# Patient Record
Sex: MALE | Race: BLACK OR AFRICAN AMERICAN | NOT HISPANIC OR LATINO
[De-identification: names, ages, dates, MRNs, and addresses within clinical notes are randomized per-mention and may not be internally consistent; named-entity substitution may affect disease eponyms.]

---

## 2018-11-05 ENCOUNTER — APPOINTMENT (OUTPATIENT)
Dept: THORACIC SURGERY | Facility: CLINIC | Age: 58
End: 2018-11-05
Payer: COMMERCIAL

## 2018-11-05 VITALS
HEART RATE: 74 BPM | WEIGHT: 215 LBS | TEMPERATURE: 98 F | OXYGEN SATURATION: 97 % | SYSTOLIC BLOOD PRESSURE: 145 MMHG | HEIGHT: 72 IN | BODY MASS INDEX: 29.12 KG/M2 | RESPIRATION RATE: 18 BRPM | DIASTOLIC BLOOD PRESSURE: 95 MMHG

## 2018-11-05 DIAGNOSIS — Z80.1 FAMILY HISTORY OF MALIGNANT NEOPLASM OF TRACHEA, BRONCHUS AND LUNG: ICD-10-CM

## 2018-11-05 DIAGNOSIS — R91.1 SOLITARY PULMONARY NODULE: ICD-10-CM

## 2018-11-05 DIAGNOSIS — Z82.0 FAMILY HISTORY OF EPILEPSY AND OTHER DISEASES OF THE NERVOUS SYSTEM: ICD-10-CM

## 2018-11-05 DIAGNOSIS — L80 VITILIGO: ICD-10-CM

## 2018-11-05 DIAGNOSIS — Z78.9 OTHER SPECIFIED HEALTH STATUS: ICD-10-CM

## 2018-11-05 PROCEDURE — 99204 OFFICE O/P NEW MOD 45 MIN: CPT

## 2018-11-05 RX ORDER — ROSUVASTATIN CALCIUM 20 MG/1
20 TABLET, FILM COATED ORAL
Refills: 0 | Status: ACTIVE | COMMUNITY

## 2018-11-05 RX ORDER — IRBESARTAN 75 MG/1
75 TABLET ORAL
Refills: 0 | Status: ACTIVE | COMMUNITY

## 2018-11-05 RX ORDER — ASPIRIN ENTERIC COATED TABLETS 81 MG 81 MG/1
81 TABLET, DELAYED RELEASE ORAL
Refills: 0 | Status: ACTIVE | COMMUNITY

## 2020-07-07 ENCOUNTER — INPATIENT (INPATIENT)
Facility: HOSPITAL | Age: 60
LOS: 2 days | Discharge: HOME | DRG: 317 | End: 2020-07-10
Attending: SURGERY | Admitting: SURGERY
Payer: COMMERCIAL

## 2020-07-07 VITALS
TEMPERATURE: 99 F | WEIGHT: 199.96 LBS | DIASTOLIC BLOOD PRESSURE: 82 MMHG | HEIGHT: 72 IN | SYSTOLIC BLOOD PRESSURE: 161 MMHG | OXYGEN SATURATION: 98 % | RESPIRATION RATE: 18 BRPM | HEART RATE: 75 BPM

## 2020-07-07 DIAGNOSIS — Z95.9 PRESENCE OF CARDIAC AND VASCULAR IMPLANT AND GRAFT, UNSPECIFIED: Chronic | ICD-10-CM

## 2020-07-07 DIAGNOSIS — S27.321A CONTUSION OF LUNG, UNILATERAL, INITIAL ENCOUNTER: ICD-10-CM

## 2020-07-07 LAB
ALBUMIN SERPL ELPH-MCNC: 3.5 G/DL — SIGNIFICANT CHANGE UP (ref 3.3–5)
ALP SERPL-CCNC: 74 U/L — SIGNIFICANT CHANGE UP (ref 40–120)
ALT FLD-CCNC: 41 U/L — SIGNIFICANT CHANGE UP (ref 12–78)
ANION GAP SERPL CALC-SCNC: 5 MMOL/L — SIGNIFICANT CHANGE UP (ref 5–17)
APTT BLD: 27.5 SEC — SIGNIFICANT CHANGE UP (ref 27.5–35.5)
AST SERPL-CCNC: 45 U/L — HIGH (ref 15–37)
BASOPHILS # BLD AUTO: 0.03 K/UL — SIGNIFICANT CHANGE UP (ref 0–0.2)
BASOPHILS NFR BLD AUTO: 0.4 % — SIGNIFICANT CHANGE UP (ref 0–2)
BILIRUB SERPL-MCNC: 0.4 MG/DL — SIGNIFICANT CHANGE UP (ref 0.2–1.2)
BUN SERPL-MCNC: 19 MG/DL — SIGNIFICANT CHANGE UP (ref 7–23)
CALCIUM SERPL-MCNC: 8.1 MG/DL — LOW (ref 8.5–10.1)
CHLORIDE SERPL-SCNC: 111 MMOL/L — HIGH (ref 96–108)
CK SERPL-CCNC: 664 U/L — HIGH (ref 26–308)
CO2 SERPL-SCNC: 25 MMOL/L — SIGNIFICANT CHANGE UP (ref 22–31)
CREAT SERPL-MCNC: 1.31 MG/DL — HIGH (ref 0.5–1.3)
EOSINOPHIL # BLD AUTO: 0.13 K/UL — SIGNIFICANT CHANGE UP (ref 0–0.5)
EOSINOPHIL NFR BLD AUTO: 1.6 % — SIGNIFICANT CHANGE UP (ref 0–6)
ETHANOL SERPL-MCNC: <10 MG/DL — SIGNIFICANT CHANGE UP (ref 0–10)
GLUCOSE SERPL-MCNC: 98 MG/DL — SIGNIFICANT CHANGE UP (ref 70–99)
HCT VFR BLD CALC: 43.7 % — SIGNIFICANT CHANGE UP (ref 39–50)
HGB BLD-MCNC: 14.4 G/DL — SIGNIFICANT CHANGE UP (ref 13–17)
IMM GRANULOCYTES NFR BLD AUTO: 0.5 % — SIGNIFICANT CHANGE UP (ref 0–1.5)
INR BLD: 1.07 RATIO — SIGNIFICANT CHANGE UP (ref 0.88–1.16)
LIDOCAIN IGE QN: 78 U/L — SIGNIFICANT CHANGE UP (ref 73–393)
LYMPHOCYTES # BLD AUTO: 1.46 K/UL — SIGNIFICANT CHANGE UP (ref 1–3.3)
LYMPHOCYTES # BLD AUTO: 17.8 % — SIGNIFICANT CHANGE UP (ref 13–44)
MCHC RBC-ENTMCNC: 29.9 PG — SIGNIFICANT CHANGE UP (ref 27–34)
MCHC RBC-ENTMCNC: 33 GM/DL — SIGNIFICANT CHANGE UP (ref 32–36)
MCV RBC AUTO: 90.7 FL — SIGNIFICANT CHANGE UP (ref 80–100)
MONOCYTES # BLD AUTO: 0.51 K/UL — SIGNIFICANT CHANGE UP (ref 0–0.9)
MONOCYTES NFR BLD AUTO: 6.2 % — SIGNIFICANT CHANGE UP (ref 2–14)
NEUTROPHILS # BLD AUTO: 6.04 K/UL — SIGNIFICANT CHANGE UP (ref 1.8–7.4)
NEUTROPHILS NFR BLD AUTO: 73.5 % — SIGNIFICANT CHANGE UP (ref 43–77)
PLATELET # BLD AUTO: 198 K/UL — SIGNIFICANT CHANGE UP (ref 150–400)
POTASSIUM SERPL-MCNC: 4 MMOL/L — SIGNIFICANT CHANGE UP (ref 3.5–5.3)
POTASSIUM SERPL-SCNC: 4 MMOL/L — SIGNIFICANT CHANGE UP (ref 3.5–5.3)
PROT SERPL-MCNC: 7.1 GM/DL — SIGNIFICANT CHANGE UP (ref 6–8.3)
PROTHROM AB SERPL-ACNC: 12.4 SEC — SIGNIFICANT CHANGE UP (ref 10.6–13.6)
RBC # BLD: 4.82 M/UL — SIGNIFICANT CHANGE UP (ref 4.2–5.8)
RBC # FLD: 12.7 % — SIGNIFICANT CHANGE UP (ref 10.3–14.5)
SODIUM SERPL-SCNC: 141 MMOL/L — SIGNIFICANT CHANGE UP (ref 135–145)
WBC # BLD: 8.21 K/UL — SIGNIFICANT CHANGE UP (ref 3.8–10.5)
WBC # FLD AUTO: 8.21 K/UL — SIGNIFICANT CHANGE UP (ref 3.8–10.5)

## 2020-07-07 PROCEDURE — 80048 BASIC METABOLIC PNL TOTAL CA: CPT

## 2020-07-07 PROCEDURE — 72125 CT NECK SPINE W/O DYE: CPT | Mod: 26

## 2020-07-07 PROCEDURE — 97116 GAIT TRAINING THERAPY: CPT | Mod: GP

## 2020-07-07 PROCEDURE — 85610 PROTHROMBIN TIME: CPT

## 2020-07-07 PROCEDURE — 81003 URINALYSIS AUTO W/O SCOPE: CPT

## 2020-07-07 PROCEDURE — 36415 COLL VENOUS BLD VENIPUNCTURE: CPT

## 2020-07-07 PROCEDURE — U0003: CPT

## 2020-07-07 PROCEDURE — 85027 COMPLETE CBC AUTOMATED: CPT

## 2020-07-07 PROCEDURE — 86901 BLOOD TYPING SEROLOGIC RH(D): CPT

## 2020-07-07 PROCEDURE — 99223 1ST HOSP IP/OBS HIGH 75: CPT

## 2020-07-07 PROCEDURE — 86769 SARS-COV-2 COVID-19 ANTIBODY: CPT

## 2020-07-07 PROCEDURE — 73562 X-RAY EXAM OF KNEE 3: CPT | Mod: 26,RT

## 2020-07-07 PROCEDURE — 86900 BLOOD TYPING SEROLOGIC ABO: CPT

## 2020-07-07 PROCEDURE — 70450 CT HEAD/BRAIN W/O DYE: CPT | Mod: 26

## 2020-07-07 PROCEDURE — 97530 THERAPEUTIC ACTIVITIES: CPT | Mod: GP

## 2020-07-07 PROCEDURE — 97162 PT EVAL MOD COMPLEX 30 MIN: CPT | Mod: GP

## 2020-07-07 PROCEDURE — 85730 THROMBOPLASTIN TIME PARTIAL: CPT

## 2020-07-07 PROCEDURE — 71260 CT THORAX DX C+: CPT | Mod: 26

## 2020-07-07 PROCEDURE — 86850 RBC ANTIBODY SCREEN: CPT

## 2020-07-07 PROCEDURE — 73721 MRI JNT OF LWR EXTRE W/O DYE: CPT | Mod: RT

## 2020-07-07 PROCEDURE — 73552 X-RAY EXAM OF FEMUR 2/>: CPT | Mod: 26,RT

## 2020-07-07 PROCEDURE — 73502 X-RAY EXAM HIP UNI 2-3 VIEWS: CPT | Mod: 26,RT

## 2020-07-07 PROCEDURE — 93010 ELECTROCARDIOGRAM REPORT: CPT

## 2020-07-07 PROCEDURE — 86803 HEPATITIS C AB TEST: CPT

## 2020-07-07 PROCEDURE — 71045 X-RAY EXAM CHEST 1 VIEW: CPT

## 2020-07-07 PROCEDURE — 74177 CT ABD & PELVIS W/CONTRAST: CPT | Mod: 26

## 2020-07-07 RX ORDER — ONDANSETRON 8 MG/1
4 TABLET, FILM COATED ORAL EVERY 6 HOURS
Refills: 0 | Status: DISCONTINUED | OUTPATIENT
Start: 2020-07-07 | End: 2020-07-09

## 2020-07-07 RX ORDER — SODIUM CHLORIDE 9 MG/ML
1000 INJECTION INTRAMUSCULAR; INTRAVENOUS; SUBCUTANEOUS
Refills: 0 | Status: DISCONTINUED | OUTPATIENT
Start: 2020-07-07 | End: 2020-07-09

## 2020-07-07 RX ORDER — HEPARIN SODIUM 5000 [USP'U]/ML
5000 INJECTION INTRAVENOUS; SUBCUTANEOUS EVERY 8 HOURS
Refills: 0 | Status: DISCONTINUED | OUTPATIENT
Start: 2020-07-07 | End: 2020-07-08

## 2020-07-07 RX ORDER — ACETAMINOPHEN 500 MG
650 TABLET ORAL ONCE
Refills: 0 | Status: COMPLETED | OUTPATIENT
Start: 2020-07-07 | End: 2020-07-07

## 2020-07-07 RX ORDER — OXYCODONE AND ACETAMINOPHEN 5; 325 MG/1; MG/1
2 TABLET ORAL EVERY 6 HOURS
Refills: 0 | Status: DISCONTINUED | OUTPATIENT
Start: 2020-07-07 | End: 2020-07-09

## 2020-07-07 RX ORDER — TETANUS TOXOID, REDUCED DIPHTHERIA TOXOID AND ACELLULAR PERTUSSIS VACCINE, ADSORBED 5; 2.5; 8; 8; 2.5 [IU]/.5ML; [IU]/.5ML; UG/.5ML; UG/.5ML; UG/.5ML
0.5 SUSPENSION INTRAMUSCULAR ONCE
Refills: 0 | Status: COMPLETED | OUTPATIENT
Start: 2020-07-07 | End: 2020-07-07

## 2020-07-07 RX ORDER — ASPIRIN/CALCIUM CARB/MAGNESIUM 324 MG
81 TABLET ORAL DAILY
Refills: 0 | Status: DISCONTINUED | OUTPATIENT
Start: 2020-07-07 | End: 2020-07-09

## 2020-07-07 RX ORDER — OXYCODONE AND ACETAMINOPHEN 5; 325 MG/1; MG/1
1 TABLET ORAL EVERY 4 HOURS
Refills: 0 | Status: DISCONTINUED | OUTPATIENT
Start: 2020-07-07 | End: 2020-07-09

## 2020-07-07 RX ORDER — SODIUM CHLORIDE 9 MG/ML
500 INJECTION INTRAMUSCULAR; INTRAVENOUS; SUBCUTANEOUS ONCE
Refills: 0 | Status: COMPLETED | OUTPATIENT
Start: 2020-07-07 | End: 2020-07-07

## 2020-07-07 RX ADMIN — HEPARIN SODIUM 5000 UNIT(S): 5000 INJECTION INTRAVENOUS; SUBCUTANEOUS at 23:06

## 2020-07-07 RX ADMIN — SODIUM CHLORIDE 100 MILLILITER(S): 9 INJECTION INTRAMUSCULAR; INTRAVENOUS; SUBCUTANEOUS at 23:03

## 2020-07-07 RX ADMIN — TETANUS TOXOID, REDUCED DIPHTHERIA TOXOID AND ACELLULAR PERTUSSIS VACCINE, ADSORBED 0.5 MILLILITER(S): 5; 2.5; 8; 8; 2.5 SUSPENSION INTRAMUSCULAR at 19:50

## 2020-07-07 RX ADMIN — Medication 81 MILLIGRAM(S): at 23:06

## 2020-07-07 RX ADMIN — SODIUM CHLORIDE 500 MILLILITER(S): 9 INJECTION INTRAMUSCULAR; INTRAVENOUS; SUBCUTANEOUS at 20:43

## 2020-07-07 RX ADMIN — Medication 650 MILLIGRAM(S): at 23:07

## 2020-07-07 RX ADMIN — SODIUM CHLORIDE 500 MILLILITER(S): 9 INJECTION INTRAMUSCULAR; INTRAVENOUS; SUBCUTANEOUS at 19:42

## 2020-07-07 NOTE — H&P ADULT - NSHPLABSRESULTS_GEN_ALL_CORE
Labs:                          14.4   8.21  )-----------( 198      ( 07 Jul 2020 18:36 )             43.7       07-07    141  |  111<H>  |  19  ----------------------------<  98  4.0   |  25  |  1.31<H>    Ca    8.1<L>      07 Jul 2020 18:36    TPro  7.1  /  Alb  3.5  /  TBili  0.4  /  DBili  x   /  AST  45<H>  /  ALT  41  /  AlkPhos  74  07-07      PT/INR - ( 07 Jul 2020 18:36 )   PT: 12.4 sec;   INR: 1.07 ratio         PTT - ( 07 Jul 2020 18:36 )  PTT:27.5 sec    < from: CT Abdomen and Pelvis w/ IV Cont (07.07.20 @ 18:58) >    trauma. No acute fracture.    IMPRESSION:     Right lower lobe opacity as described above; differential includes infectious/inflammatory etiologies, atelectasis or a pulmonary contusion.    No additional acute intrathoracic or intra-abdominal sequelae of trauma.      < from: CT Cervical Spine No Cont (07.07.20 @ 18:57) >    IMPRESSION:  No acute intracranial hemorrhage    No acute fracture cervical spine. If pain persists, follow-up MRI exam recommended.          < end of copied text >

## 2020-07-07 NOTE — H&P ADULT - NSHPPHYSICALEXAM_GEN_ALL_CORE
Vital Signs Last 24 Hrs  T(C): 36.6 (07 Jul 2020 22:26), Max: 37.1 (07 Jul 2020 18:09)  T(F): 97.8 (07 Jul 2020 22:26), Max: 98.8 (07 Jul 2020 18:09)  HR: 55 (07 Jul 2020 22:26) (49 - 81)  BP: 155/85 (07 Jul 2020 22:26) (147/88 - 177/99)  BP(mean): --  RR: 18 (07 Jul 2020 22:26) (18 - 18)  SpO2: 98% (07 Jul 2020 22:26) (98% - 100%)  PHYSICAL EXAM:  Constitutional: NAD, GCS: 15/15  AOX3  Eyes:  WNL  ENMT:  WNL  Neck:  WNL, non tender  Back: Non tender  Respiratory: CTABL  Cardiovascular:  S1+S2+0  Gastrointestinal: Soft, ND , NT  Genitourinary:  WNL  Extremities: NV intact, Rt leg in KI, tendered on movement.   Vascular:  Intact  Neurological: No focal neurological deficit,  CN, motor and sensory system grossly intact.  Skin: WNL  Musculoskeletal: WNL  Psychiatric: Grossly WNL

## 2020-07-07 NOTE — CONSULT NOTE ADULT - SUBJECTIVE AND OBJECTIVE BOX
59 y/o M presents to ED s/p fall from eight approx 12 feet off ladder. Pt tried to jump to break his fall and right leg became stuck in between the ladder. Orthopedic consulted for right knee pain and swelling. Pt was unable to ambulate after injury, states his knee cap feels out of place. Pt admits to head strike but denies LOC. denies N/T RLE , no other extremity complaints. Pt was seen and evaluate by trauma team and being admitted to trauma for observation of pulmonary contusion, noted on chest CT scan   X rays right knee, no obvious Fx/dislocation, Patella Sizerock noted.   X rays pelvis/Right Hip: no obvious Fx/dislocation   X ray femur no Fx/dislocation     PMH: HTN, MI s/p 2 stents  Allergy: PCN    PE   A&O x 3  awake and alert  Neck and spine: non tender, skin intact, sensation intact    B/L Upper extremities: FROM, shoulder/elbow/wrist/fingers , non tender, no deformity, no crepitus, SILT, M/R/U nerves intact, radial pulse 2+ B/L    Left LE: skin intact, non tender, compartments soft non tender, FROM hip/knee/ankle/toes SILT, no deformity, no crepitus, DP 2+     Right LE:   skin intact  compartments soft non tender  neg axial load  no pain with log roll in hip  TTP over patella  + palpable defect at patella tendon   unable to SLR   FROM ankle and toes  SILT   DP intact

## 2020-07-07 NOTE — ED PROVIDER NOTE - MUSCULOSKELETAL, MLM
Spine appears normal, neck non-tender AFROM without pain, back and pelvis non tender and stable, right hip non tnder and no swelling, +moderate effusion and edema of right knee, +tenderness to palpation of right knee, pt unable to right SLR RLE. Left SLR normal.

## 2020-07-07 NOTE — ED PROVIDER NOTE - PROGRESS NOTE DETAILS
Javed Thomson for attending Dr. South. Tom trauma attending for consult. Dr. South:  Admission for suspected R pulm contusion accepted by Dr. Swift/Trauma.  Ortho resident aware of ED consult.

## 2020-07-07 NOTE — ED PROVIDER NOTE - SKIN, MLM
Skin normal color for race, warm, dry and intact. No evidence of rash. Superficial abrasion to posterior left thigh

## 2020-07-07 NOTE — ED ADULT TRIAGE NOTE - CHIEF COMPLAINT QUOTE
Patient comes to ED for fall off 10 foot ladder. Patient reports ladder started to move, as ladder moved patient jumped, landing on whole body right knee injury. - LOC, - blood thinners. reports pain to right knee. no chest pain no head pain.

## 2020-07-07 NOTE — ED PROVIDER NOTE - CLINICAL SUMMARY MEDICAL DECISION MAKING FREE TEXT BOX
61 y/o male s/p falling from 12 ft ladder resulting in right knee pain. Plan - XR right knee, PAN scan, trauma labs, TDaP, urine, re-assess. Pt refused pain medication at initial evaluation.

## 2020-07-07 NOTE — ED PROVIDER NOTE - CONSTITUTIONAL, MLM
normal... Well appearing, awake, alert, oriented to person, place, time/situation and in no apparent respiratory distress. Non-toxic

## 2020-07-07 NOTE — H&P ADULT - ASSESSMENT
58 Y old male S/P fall 12 feet with possible lung contusion, O2 sat stable, Rt patellar  tendon rupture.  Multimodal pain control  neuro checks Q4    Cervical collar: cleared  incentive spirometry, pulse Ox Q 4  F/U CXR  Vitals, IS/OS Q 4  Diet:   ( ) as tolerated (X ) NPO  DVT/GI prophylaxis  Activity:   NWB RLE , KI   PT post op   Hold A/C  Resume other home med   Medical service consult  Orthopedic following : OR in am   SW for eventual D/C planning 58 Y old male S/P fall 12 feet with possible lung contusion, O2 sat stable, Rt patellar  tendon rupture.  Multimodal pain control  neuro checks Q4    Cervical collar: cleared  incentive spirometry, pulse Ox Q 4  F/U CXR  Vitals, IS/OS Q 4  Diet:   ( ) as tolerated (X ) NPO  DVT/GI prophylaxis  Activity:   NWB RLE , KI   PT post op   Hold A/C  Resume other home med   Medical service consult  Orthopedic following : OR in am   if O2 sat stable over night and CXR WNL will bw cleared for orthopedic surgery in am will need a post op CXR.  SW for eventual D/C planning

## 2020-07-07 NOTE — ED PROVIDER NOTE - OBJECTIVE STATEMENT
59 y/o male with a PMHx of HLD, MI, and s/p 2 cardiac stents, presents BIBEMS to the ED s/p falling from 12 ft ladder about 45 mins ago. The ladder started to move as he was coming down, and as it moved, pt jumped off and his leg got stuck in it. Pt then landed on the ground on his right side and right foot stuck in ladder as ladder fell on him. Pt reports head trauma, but denies head and neck pain at this time. Pt also reports medial displacement of patella w/o visible deformity with associated sharp right knee pain. Pt unable to put weight on RLE. Pt denies LOC, n/v/d, abd pain, loss of urinary of bowel control, and AC use. Pt unsure if tetanus is UTD. ?penicillin allergy.

## 2020-07-07 NOTE — ED PROVIDER NOTE - EYES, MLM
Clear bilaterally, pupils equal, round and reactive to light. EOMI, raccoons negative, battles negative

## 2020-07-07 NOTE — H&P ADULT - HISTORY OF PRESENT ILLNESS
59 y/o male with a PMHx of HLD, MI, and s/p 2 cardiac stents, presents BIBEMS to the ED s/p falling from 12 ft ladder about 45 mins ago. The ladder started to move as he was coming down, and as it moved, pt jumped off and his leg got stuck in it. Pt then landed on the ground on his right side and right foot stuck in ladder as ladder fell on him. In ER ABC intact No headache, no neck pain. no SOB, no chest or abdominal pain. Pt reports head trauma, but denies head and neck pain at this time. Pt also reports medial displacement of patella w/o visible deformity with associated sharp right knee pain. Pt unable to put weight on RLE. Pt denies LOC, n/v/d, abd pain, loss of urinary of bowel control, and AC use. HD stable, O2 sat 100 on RA.

## 2020-07-07 NOTE — ED PROVIDER NOTE - CARE PLAN
Principal Discharge DX:	Contusion of right lung, initial encounter  Secondary Diagnosis:	Knee injury, right, initial encounter  Secondary Diagnosis:	Fall from ladder, initial encounter

## 2020-07-07 NOTE — ED ADULT NURSE NOTE - OBJECTIVE STATEMENT
pt c/o right knee pain s/p fall from ladder approximately 10-12 feet. denies LOC, positive head strike, on ASA for previous MI w/ 2 stents. pt on the monitor, in no acute distress. refusing pain med offer. Ortho spoke to patient over need for surgery. pt speaking with wife to discuss plan. ana bernard

## 2020-07-07 NOTE — ED PROVIDER NOTE - ENMT, MLM
Airway patent, Nasal mucosa clear. Mouth with normal mucosa. Throat has no vesicles, no oropharyngeal exudates and uvula is midline. No clinical evidence of facial injury

## 2020-07-08 LAB
ANION GAP SERPL CALC-SCNC: 5 MMOL/L — SIGNIFICANT CHANGE UP (ref 5–17)
APPEARANCE UR: CLEAR — SIGNIFICANT CHANGE UP
APTT BLD: 30.3 SEC — SIGNIFICANT CHANGE UP (ref 27.5–35.5)
BILIRUB UR-MCNC: NEGATIVE — SIGNIFICANT CHANGE UP
BUN SERPL-MCNC: 16 MG/DL — SIGNIFICANT CHANGE UP (ref 7–23)
CALCIUM SERPL-MCNC: 8.1 MG/DL — LOW (ref 8.5–10.1)
CHLORIDE SERPL-SCNC: 110 MMOL/L — HIGH (ref 96–108)
CO2 SERPL-SCNC: 25 MMOL/L — SIGNIFICANT CHANGE UP (ref 22–31)
COLOR SPEC: YELLOW — SIGNIFICANT CHANGE UP
CREAT SERPL-MCNC: 1.11 MG/DL — SIGNIFICANT CHANGE UP (ref 0.5–1.3)
DIFF PNL FLD: NEGATIVE — SIGNIFICANT CHANGE UP
GLUCOSE SERPL-MCNC: 93 MG/DL — SIGNIFICANT CHANGE UP (ref 70–99)
GLUCOSE UR QL: NEGATIVE MG/DL — SIGNIFICANT CHANGE UP
HCT VFR BLD CALC: 41.7 % — SIGNIFICANT CHANGE UP (ref 39–50)
HGB BLD-MCNC: 13.6 G/DL — SIGNIFICANT CHANGE UP (ref 13–17)
INR BLD: 1.1 RATIO — SIGNIFICANT CHANGE UP (ref 0.88–1.16)
KETONES UR-MCNC: NEGATIVE — SIGNIFICANT CHANGE UP
LEUKOCYTE ESTERASE UR-ACNC: NEGATIVE — SIGNIFICANT CHANGE UP
MCHC RBC-ENTMCNC: 29.6 PG — SIGNIFICANT CHANGE UP (ref 27–34)
MCHC RBC-ENTMCNC: 32.6 GM/DL — SIGNIFICANT CHANGE UP (ref 32–36)
MCV RBC AUTO: 90.8 FL — SIGNIFICANT CHANGE UP (ref 80–100)
NITRITE UR-MCNC: NEGATIVE — SIGNIFICANT CHANGE UP
PH UR: 6 — SIGNIFICANT CHANGE UP (ref 5–8)
PLATELET # BLD AUTO: 188 K/UL — SIGNIFICANT CHANGE UP (ref 150–400)
POTASSIUM SERPL-MCNC: 3.6 MMOL/L — SIGNIFICANT CHANGE UP (ref 3.5–5.3)
POTASSIUM SERPL-SCNC: 3.6 MMOL/L — SIGNIFICANT CHANGE UP (ref 3.5–5.3)
PROT UR-MCNC: NEGATIVE MG/DL — SIGNIFICANT CHANGE UP
PROTHROM AB SERPL-ACNC: 12.8 SEC — SIGNIFICANT CHANGE UP (ref 10.6–13.6)
RBC # BLD: 4.59 M/UL — SIGNIFICANT CHANGE UP (ref 4.2–5.8)
RBC # FLD: 12.8 % — SIGNIFICANT CHANGE UP (ref 10.3–14.5)
SARS-COV-2 IGG SERPL QL IA: NEGATIVE — SIGNIFICANT CHANGE UP
SARS-COV-2 IGM SERPL IA-ACNC: <3.8 AU/ML — SIGNIFICANT CHANGE UP
SARS-COV-2 RNA SPEC QL NAA+PROBE: SIGNIFICANT CHANGE UP
SODIUM SERPL-SCNC: 140 MMOL/L — SIGNIFICANT CHANGE UP (ref 135–145)
SP GR SPEC: 1.01 — SIGNIFICANT CHANGE UP (ref 1.01–1.02)
UROBILINOGEN FLD QL: NEGATIVE MG/DL — SIGNIFICANT CHANGE UP
WBC # BLD: 8.88 K/UL — SIGNIFICANT CHANGE UP (ref 3.8–10.5)
WBC # FLD AUTO: 8.88 K/UL — SIGNIFICANT CHANGE UP (ref 3.8–10.5)

## 2020-07-08 PROCEDURE — 99231 SBSQ HOSP IP/OBS SF/LOW 25: CPT

## 2020-07-08 PROCEDURE — 71045 X-RAY EXAM CHEST 1 VIEW: CPT | Mod: 26

## 2020-07-08 PROCEDURE — 73721 MRI JNT OF LWR EXTRE W/O DYE: CPT | Mod: 26,RT

## 2020-07-08 PROCEDURE — 99254 IP/OBS CNSLTJ NEW/EST MOD 60: CPT

## 2020-07-08 RX ORDER — HEPARIN SODIUM 5000 [USP'U]/ML
5000 INJECTION INTRAVENOUS; SUBCUTANEOUS EVERY 8 HOURS
Refills: 0 | Status: COMPLETED | OUTPATIENT
Start: 2020-07-08 | End: 2020-07-08

## 2020-07-08 RX ORDER — IRBESARTAN 75 MG/1
1 TABLET ORAL
Qty: 0 | Refills: 0 | DISCHARGE

## 2020-07-08 RX ORDER — ROSUVASTATIN CALCIUM 5 MG/1
1 TABLET ORAL
Qty: 0 | Refills: 0 | DISCHARGE

## 2020-07-08 RX ORDER — SODIUM CHLORIDE 9 MG/ML
1000 INJECTION, SOLUTION INTRAVENOUS
Refills: 0 | Status: DISCONTINUED | OUTPATIENT
Start: 2020-07-08 | End: 2020-07-09

## 2020-07-08 RX ORDER — EZETIMIBE 10 MG/1
1 TABLET ORAL
Qty: 0 | Refills: 0 | DISCHARGE

## 2020-07-08 RX ORDER — ATORVASTATIN CALCIUM 80 MG/1
40 TABLET, FILM COATED ORAL AT BEDTIME
Refills: 0 | Status: DISCONTINUED | OUTPATIENT
Start: 2020-07-08 | End: 2020-07-09

## 2020-07-08 RX ADMIN — HEPARIN SODIUM 5000 UNIT(S): 5000 INJECTION INTRAVENOUS; SUBCUTANEOUS at 20:09

## 2020-07-08 NOTE — PROGRESS NOTE ADULT - ASSESSMENT
A/P:  Right lung contusion  Right patella tendon tear  Pt stable and cleared from trauma surgical standpoint for any indicated orthopedic intervention as required  NPO, IV hydration  GI/DVT prophylaxis  Pain control  Incentive spirometry  Pt hemodynamically stable  Cont current care and meds  Pt aware of and agrees with all of the above

## 2020-07-08 NOTE — CONSULT NOTE ADULT - SUBJECTIVE AND OBJECTIVE BOX
HPI:  59 y/o male with a PMHx of HLD, MI, and s/p 2 cardiac stents, presents BIBEMS to the ED s/p falling from 12 ft ladder about 45 mins ago. The ladder started to move as he was coming down, and as it moved, pt jumped off and his leg got stuck in it. Pt then landed on the ground on his right side and right foot stuck in ladder as ladder fell on him. In ER ABC intact No headache, no neck pain. no SOB, no chest or abdominal pain. Pt reports head trauma, but denies head and neck pain at this time. Pt also reports medial displacement of patella w/o visible deformity with associated sharp right knee pain. Pt unable to put weight on RLE. Pt denies LOC, n/v/d, abd pain, loss of urinary of bowel control, and AC use. HD stable, O2 sat 100 on RA. (2020 20:24)      PAST MEDICAL & SURGICAL HISTORY:  MI (myocardial infarction)  HLD (hyperlipidemia)  S/P angioplasty with stent: x 2      Home Medications:  aspirin 81 mg oral tablet: 1 tab(s) orally once a day (2020 03:50)  ezetimibe 10 mg oral tablet: 1 tab(s) orally once a day (2020 03:50)  irbesartan 75 mg oral tablet: 1 tab(s) orally once a day (2020 03:50)  rosuvastatin 10 mg oral tablet: 1 tab(s) orally once a day (2020 03:50)      MEDICATIONS  (STANDING):  aspirin enteric coated 81 milliGRAM(s) Oral daily  atorvastatin 40 milliGRAM(s) Oral at bedtime  sodium chloride 0.9%. 1000 milliLiter(s) (100 mL/Hr) IV Continuous <Continuous>    MEDICATIONS  (PRN):  ondansetron Injectable 4 milliGRAM(s) IV Push every 6 hours PRN Nausea  oxycodone    5 mG/acetaminophen 325 mG 1 Tablet(s) Oral every 4 hours PRN Moderate Pain (4 - 6)  oxycodone    5 mG/acetaminophen 325 mG 2 Tablet(s) Oral every 6 hours PRN Severe Pain (7 - 10)      Allergies    penicillins (Unknown)    Intolerances        SOCIAL HISTORY: Denies tobacco, etoh abuse or illicit drug use    FAMILY HISTORY:      Vital Signs Last 24 Hrs  T(C): 36.8 (2020 07:44), Max: 37.1 (2020 18:09)  T(F): 98.3 (2020 07:44), Max: 98.8 (2020 18:09)  HR: 61 (2020 07:44) (49 - 81)  BP: 153/90 (2020 07:44) (147/88 - 177/99)  BP(mean): --  RR: 20 (2020 07:44) (18 - 20)  SpO2: 99% (2020 07:44) (97% - 100%)        REVIEW OF SYSTEMS:    CONSTITUTIONAL:  As per HPI.  HEENT:  Eyes:  No diplopia or blurred vision. ENT:  No earache, sore throat or runny nose.  CARDIOVASCULAR:  No pressure, squeezing, tightness, heaviness or aching about the chest, neck, axilla or epigastrium.  RESPIRATORY:  No cough, shortness of breath, PND or orthopnea.  GASTROINTESTINAL:  No nausea, vomiting or diarrhea.  GENITOURINARY:  No dysuria, frequency or urgency.  MUSCULOSKELETAL:  As per HPI.  SKIN:  No change in skin, hair or nails.  NEUROLOGIC:  No paresthesias, fasciculations, seizures or weakness.  PSYCHIATRIC:  No disorder of thought or mood.  ENDOCRINE:  No heat or cold intolerance, polyuria or polydipsia.  HEMATOLOGICAL:  No easy bruising or bleedings:  .     PHYSICAL EXAMINATION:    GENERAL APPEARANCE:  Pt. is not currently dyspneic, in no distress. Pt. is alert, oriented, and pleasant.  HEENT:  Pupils are normal and react normally. No icterus. Mucous membranes well colored.  NECK:  Supple. No lymphadenopathy. Jugular venous pressure not elevated. Carotids equal.   HEART:   The cardiac impulse has a normal quality. Regular. Normal S1 and S2. There are no murmurs, rubs or gallops noted  CHEST:  Chest is clear to auscultation. Normal respiratory effort.  ABDOMEN:  Soft and nontender.   EXTREMITIES:  There is no cyanosis, clubbing or edema.   SKIN:  No rash or significant lesions are noted.    LABS:                        13.6   8.88  )-----------( 188      ( 2020 04:20 )             41.7     07-08    140  |  110<H>  |  16  ----------------------------<  93  3.6   |  25  |  1.11    Ca    8.1<L>      2020 04:20    TPro  7.1  /  Alb  3.5  /  TBili  0.4  /  DBili  x   /  AST  45<H>  /  ALT  41  /  AlkPhos  74  07-07    LIVER FUNCTIONS - ( 2020 18:36 )  Alb: 3.5 g/dL / Pro: 7.1 gm/dL / ALK PHOS: 74 U/L / ALT: 41 U/L / AST: 45 U/L / GGT: x           PT/INR - ( 2020 04:20 )   PT: 12.8 sec;   INR: 1.10 ratio         PTT - ( 2020 04:20 )  PTT:30.3 sec  CARDIAC MARKERS ( 2020 18:36 )  x     / x     / 664 U/L / x     / x          Urinalysis Basic - ( 2020 04:55 )    Color: Yellow / Appearance: Clear / S.015 / pH: x  Gluc: x / Ketone: Negative  / Bili: Negative / Urobili: Negative mg/dL   Blood: x / Protein: Negative mg/dL / Nitrite: Negative   Leuk Esterase: Negative / RBC: x / WBC x   Sq Epi: x / Non Sq Epi: x / Bacteria: x            RADIOLOGY & ADDITIONAL STUDIES:     CT Chest w/ IV Cont (20 @ 18:58) >  INTERPRETATION:  CLINICAL INFORMATION: 12 foot fall off of ladder. Hit back of head, back    COMPARISON: None.    PROCEDURE:   CT of the Chest, Abdomen and Pelvis was performed with intravenous contrast.   Imaging was performed through the chest in the arterial phase followed by imaging of the abdomen and pelvis in the portal venous phase.  Intravenous contrast: 90 ml Omnipaque 350. 10 ml discarded.  Oral contrast: None.  Sagittal and coronal reformats were performed.    FINDINGS:  CHEST:   LUNGS AND LARGE AIRWAYS: Patent central airways. Opacity at the medial aspect of the right lower lobe which is predominantly groundglass in attenuation and dense medially. Remainder of the lungs are clear.  PLEURA: No pleural effusion.  VESSELS: Thoracic aorta normal in caliber. Stent in the left anterior descending artery.   HEART: Heart size is normal. No pericardial effusion.  MEDIASTINUM AND SWATI: No lymphadenopathy.  CHEST WALL AND LOWER NECK: No enlarged axillary lymph nodes.    ABDOMEN AND PELVIS:  LIVER: Subcentimeter low-attenuation lesions in the left hepatic lobe, too small to characterize.  BILE DUCTS: Normal caliber.  GALLBLADDER: Within normal limits.  SPLEEN: Within normal limits.  PANCREAS: Within normal limits.  ADRENALS: Within normal limits.  KIDNEYS/URETERS: Within normal limits.    BLADDER: Within normal limits.  REPRODUCTIVE ORGANS: Prostate not enlarged.    BOWEL: Colonic diverticulosis without evidence of diverticulitis. No bowel obstruction. No evidence of appendicitis.  PERITONEUM: No ascites.  VESSELS: Abdominal aorta normal in caliber.  RETROPERITONEUM/LYMPH NODES: No lymphadenopathy.    ABDOMINAL WALL: Small fat-containing umbilical hernia.  BONES/SOFT TISSUES: Ossific densities at the anterior aspect of the right hip (series 3 image 106), possibly related to prior trauma. No acute fracture.    IMPRESSION:     Right lower lobe opacity as described above; differential includes infectious/inflammatory etiologies, atelectasis or a pulmonary contusion.    No additional acute intrathoracic or intra-abdominal sequelae of trauma. HPI:    60 y.o.m with a PMHx of HLD, MI, and s/p 2 cardiac stents, admitted s/p falling from 12 ft ladder about 45 mins ago. The ladder started to move as he was coming down, and as it moved, pt jumped off and his leg got stuck in it. Pt then landed on the ground on his right side and right foot stuck in ladder as ladder fell on him. In ER ABC intact No headache, no neck pain. no SOB, no chest or abdominal pain. Pt reports head trauma, but denies head and neck pain at this time. Pt also reports medial displacement of patella w/o visible deformity with associated sharp right knee pain. Pt unable to put weight on RLE. Pt denies LOC, n/v/d, abd pain, loss of urinary of bowel control, and AC use. HD stable, O2 sat 100 on RA. pat ct chest possible contusion & asked to see for pulmonary eval.    PAST MEDICAL & SURGICAL HISTORY:  MI (myocardial infarction)  HLD (hyperlipidemia)  S/P angioplasty with stent: x 2      Home Medications:  aspirin 81 mg oral tablet: 1 tab(s) orally once a day (2020 03:50)  ezetimibe 10 mg oral tablet: 1 tab(s) orally once a day (2020 03:50)  irbesartan 75 mg oral tablet: 1 tab(s) orally once a day (2020 03:50)  rosuvastatin 10 mg oral tablet: 1 tab(s) orally once a day (2020 03:50)      MEDICATIONS  (STANDING):  aspirin enteric coated 81 milliGRAM(s) Oral daily  atorvastatin 40 milliGRAM(s) Oral at bedtime  sodium chloride 0.9%. 1000 milliLiter(s) (100 mL/Hr) IV Continuous <Continuous>    MEDICATIONS  (PRN):  ondansetron Injectable 4 milliGRAM(s) IV Push every 6 hours PRN Nausea  oxycodone    5 mG/acetaminophen 325 mG 1 Tablet(s) Oral every 4 hours PRN Moderate Pain (4 - 6)  oxycodone    5 mG/acetaminophen 325 mG 2 Tablet(s) Oral every 6 hours PRN Severe Pain (7 - 10)      Allergies    penicillins (Unknown)    Intolerances        SOCIAL HISTORY: Denies tobacco, etoh abuse or illicit drug use    FAMILY HISTORY:      Vital Signs Last 24 Hrs  T(C): 36.8 (2020 07:44), Max: 37.1 (2020 18:09)  T(F): 98.3 (2020 07:44), Max: 98.8 (2020 18:09)  HR: 61 (2020 07:44) (49 - 81)  BP: 153/90 (2020 07:44) (147/88 - 177/99)  BP(mean): --  RR: 20 (2020 07:44) (18 - 20)  SpO2: 99% (2020 07:44) (97% - 100%)        REVIEW OF SYSTEMS:    CONSTITUTIONAL:  As per HPI.  HEENT:  Eyes:  No diplopia or blurred vision. ENT:  No earache, sore throat or runny nose.  CARDIOVASCULAR:  No pressure, squeezing, tightness, heaviness or aching about the chest, neck, axilla or epigastrium.  RESPIRATORY:  No cough, shortness of breath, PND or orthopnea.  GASTROINTESTINAL:  No nausea, vomiting or diarrhea.  GENITOURINARY:  No dysuria, frequency or urgency.  MUSCULOSKELETAL:  As per HPI.  SKIN:  No change in skin, hair or nails.  NEUROLOGIC:  No paresthesias, fasciculations, seizures or weakness.  PSYCHIATRIC:  No disorder of thought or mood.  ENDOCRINE:  No heat or cold intolerance, polyuria or polydipsia.  HEMATOLOGICAL:  No easy bruising or bleedings:  .     PHYSICAL EXAMINATION:    GENERAL APPEARANCE:  Pt. is not currently dyspneic, in no distress. Pt. is alert, oriented, and pleasant.  HEENT:  Pupils are normal and react normally. No icterus. Mucous membranes well colored.  NECK:  Supple. No lymphadenopathy. Jugular venous pressure not elevated. Carotids equal.   HEART:   The cardiac impulse has a normal quality. Regular. Normal S1 and S2. There are no murmurs, rubs or gallops noted  CHEST:  Chest is clear to auscultation. Normal respiratory effort.  ABDOMEN:  Soft and nontender.   EXTREMITIES:  There is no cyanosis, clubbing or edema.   SKIN:  No rash or significant lesions are noted.    LABS:                        13.6   8.88  )-----------( 188      ( 2020 04:20 )             41.7     07-08    140  |  110<H>  |  16  ----------------------------<  93  3.6   |  25  |  1.11    Ca    8.1<L>      2020 04:20    TPro  7.1  /  Alb  3.5  /  TBili  0.4  /  DBili  x   /  AST  45<H>  /  ALT  41  /  AlkPhos  74  07-07    LIVER FUNCTIONS - ( 2020 18:36 )  Alb: 3.5 g/dL / Pro: 7.1 gm/dL / ALK PHOS: 74 U/L / ALT: 41 U/L / AST: 45 U/L / GGT: x           PT/INR - ( 2020 04:20 )   PT: 12.8 sec;   INR: 1.10 ratio         PTT - ( 2020 04:20 )  PTT:30.3 sec  CARDIAC MARKERS ( 2020 18:36 )  x     / x     / 664 U/L / x     / x          Urinalysis Basic - ( 2020 04:55 )    Color: Yellow / Appearance: Clear / S.015 / pH: x  Gluc: x / Ketone: Negative  / Bili: Negative / Urobili: Negative mg/dL   Blood: x / Protein: Negative mg/dL / Nitrite: Negative   Leuk Esterase: Negative / RBC: x / WBC x   Sq Epi: x / Non Sq Epi: x / Bacteria: x            RADIOLOGY & ADDITIONAL STUDIES:     CT Chest w/ IV Cont (20 @ 18:58) >  INTERPRETATION:  CLINICAL INFORMATION: 12 foot fall off of ladder. Hit back of head, back    COMPARISON: None.    PROCEDURE:   CT of the Chest, Abdomen and Pelvis was performed with intravenous contrast.   Imaging was performed through the chest in the arterial phase followed by imaging of the abdomen and pelvis in the portal venous phase.  Intravenous contrast: 90 ml Omnipaque 350. 10 ml discarded.  Oral contrast: None.  Sagittal and coronal reformats were performed.    FINDINGS:  CHEST:   LUNGS AND LARGE AIRWAYS: Patent central airways. Opacity at the medial aspect of the right lower lobe which is predominantly groundglass in attenuation and dense medially. Remainder of the lungs are clear.  PLEURA: No pleural effusion.  VESSELS: Thoracic aorta normal in caliber. Stent in the left anterior descending artery.   HEART: Heart size is normal. No pericardial effusion.  MEDIASTINUM AND SWATI: No lymphadenopathy.  CHEST WALL AND LOWER NECK: No enlarged axillary lymph nodes.    ABDOMEN AND PELVIS:  LIVER: Subcentimeter low-attenuation lesions in the left hepatic lobe, too small to characterize.  BILE DUCTS: Normal caliber.  GALLBLADDER: Within normal limits.  SPLEEN: Within normal limits.  PANCREAS: Within normal limits.  ADRENALS: Within normal limits.  KIDNEYS/URETERS: Within normal limits.    BLADDER: Within normal limits.  REPRODUCTIVE ORGANS: Prostate not enlarged.    BOWEL: Colonic diverticulosis without evidence of diverticulitis. No bowel obstruction. No evidence of appendicitis.  PERITONEUM: No ascites.  VESSELS: Abdominal aorta normal in caliber.  RETROPERITONEUM/LYMPH NODES: No lymphadenopathy.    ABDOMINAL WALL: Small fat-containing umbilical hernia.  BONES/SOFT TISSUES: Ossific densities at the anterior aspect of the right hip (series 3 image 106), possibly related to prior trauma. No acute fracture.    IMPRESSION:     Right lower lobe opacity as described above; differential includes infectious/inflammatory etiologies, atelectasis or a pulmonary contusion.    No additional acute intrathoracic or intra-abdominal sequelae of trauma.

## 2020-07-08 NOTE — CONSULT NOTE ADULT - ASSESSMENT
A: 60M with right knee patella tendon tear    P:  bulky hartley and knee immobilizer placed  no ROM RLE  WBAT RLE In Knee immobilizer   pain control   admit to trauma for observation   informed patient surgical intervention is recommended for this injury. options risks benefits complications reviewed with patient regarding surgical intervetion. Pt understands but would like to discuss with wife before making any further decisions  NPO at midnight   hold chemical anticoagulation   IVF  trauma clearance for possible surgery tomorrow   will discuss with patient again regarding surgery once he speaks with is wife   will discuss with attending on call Dr Anderson
PROBLEMS:    S/P FALL with right patellar tendon rupture  Renal Insufficiency with elevated CK in setting of trauma-improved  Opacity of RLL likely due to trauma/pulmonary contusion  HTN  CAD    plan:    pulmonary contusion minimal, possible atelectasis, there is no absolute contraindication for plan procedure  pain control  incentive spirometry  supportive care  dvt prophylasix
59 yo male admitted with right patellar tendon rupture   -admitted to surgical service  -Imaging reviewed  -EKG: NSR, no STT abnormality  -Chest Xray reviewed: likely due to chest trauma, monitor pulse ox closely, incentive spirometry, VS q 4 hrs  -f/u AM labs, cbc, bmp, mg, phos  -hold AC for OR in AM  -activity per primary team  -Optimization/pulmonary contusion per trauma pending  -pt medically optimized from hospitalist stand point for OR in AM with high risk given cardiac history and pulmonary contusion    #Renal Insufficiency with elevated CK in setting of trauma  -baseline Cr unknown, denies h/o CKD  -cont gentle hydration  -strict i/o's  -hold ARB, monitor BP closely (has been off for 1 month as outpt due to noncompliance)    #Incidental findings on CT scan  -Opacity of RLL likely due to trauma/pulmonary contusion: pulse ox q 4 hrs, incentive spirometry  -Hepatic lesions, f/u as outpt, too small to characterize    #HTN/CAD  -resume home medications, will hold ARB given elevated Cr   -meds verified using Dr. Bey, pt  hx and Pam JONES

## 2020-07-08 NOTE — PROGRESS NOTE ADULT - ASSESSMENT
A/P: 60M with right knee patella tendon tear  Plan for OR today 7/8  FU MRI R Knee  bulky hartley and knee immobilizer placed  no ROM RLE  WBAT RLE In Knee immobilizer   pain control   admit to trauma for observation   NPO  hold chemical anticoagulation   IVF  trauma clearance for possible surgery tomorrow, please document  Medicine cleared  Discussed with Dr. Anderson, aware and in agreement with above plan A/P: 60M with right knee patella tendon tear  Plan for OR today 7/8  FU MRI R Knee  bulky hartley and knee immobilizer placed  no ROM RLE  WBAT RLE In Knee immobilizer   pain control   admit to trauma for observation   NPO after breakfast  hold chemical anticoagulation   IVF  trauma clearance for possible surgery tomorrow, please document  Medicine cleared  Discussed with Dr. Anderson, aware and in agreement with above plan

## 2020-07-08 NOTE — PROGRESS NOTE ADULT - ATTENDING COMMENTS
Patient seen and examined.  I agree with the note.  For surgical fixation of Rt patellar tendon rupture.  Long discussion with the patient about the surgery and complication and recovery performed. The patient want to proceed for the surgery.

## 2020-07-08 NOTE — CONSULT NOTE ADULT - SUBJECTIVE AND OBJECTIVE BOX
CC: Fall    INTERVAL HPI/OVERNIGHT EVENTS: 59 yo male with pmh/o HTN, Vitiligo, CAD s/p PCI x 2, MI, who presented to Ed after falling from ladder and sustained right patellar tendon rupture with likely pulmonary contusion given trauma as found on imaging. Pt states he did not have any symptoms prior to falling off ladder, states he was 'not careful and fell'. Denies any sob, cough, cp, palpitations, diaphoresis, parasthesias, incontinence, HA, n/v/d, HA. Of note, pt has been off anti HTN and HLD medication for one month due to COVID limiting his ability to see his PMD. Pt lives in both [place] and [place] and cardiologist Dr. Vazquez is in [place]. PMD Dr. Huston.     ROS: per HPI    PMHx/PSHx: HTN, HLD, Vitiligo, MI, CAD s/p PCI x 2 at 44 yo    Social Hx: denies smoking, drug use, etoh abuse. Works as industrial . lives in [place] and [place].     Family Hx: Mother-Lung CA, Father-CVA, parkinsons    Medications: ASA 81 mg po qd, Ezetimibe 10mg po qd, Irbesartan 75 mg , Rosuvastatin 10mg po qhs      Vital Signs Last 24 Hrs  T(C): 36.6 (07 Jul 2020 22:26), Max: 37.1 (07 Jul 2020 18:09)  T(F): 97.8 (07 Jul 2020 22:26), Max: 98.8 (07 Jul 2020 18:09)  HR: 55 (07 Jul 2020 22:26) (49 - 81)  BP: 155/85 (07 Jul 2020 22:26) (147/88 - 177/99)  BP(mean): --  RR: 18 (07 Jul 2020 22:26) (18 - 18)  SpO2: 98% (07 Jul 2020 22:26) (98% - 100%)  I&O's Detail      CARDIAC MARKERS ( 07 Jul 2020 18:36 )  x     / x     / 664 U/L / x     / x                                14.4   8.21  )-----------( 198      ( 07 Jul 2020 18:36 )             43.7     07 Jul 2020 18:36    141    |  111    |  19     ----------------------------<  98     4.0     |  25     |  1.31     Ca    8.1        07 Jul 2020 18:36    TPro  7.1    /  Alb  3.5    /  TBili  0.4    /  DBili  x      /  AST  45     /  ALT  41     /  AlkPhos  74     07 Jul 2020 18:36    PT/INR - ( 07 Jul 2020 18:36 )   PT: 12.4 sec;   INR: 1.07 ratio         PTT - ( 07 Jul 2020 18:36 )  PTT:27.5 sec  CAPILLARY BLOOD GLUCOSE        LIVER FUNCTIONS - ( 07 Jul 2020 18:36 )  Alb: 3.5 g/dL / Pro: 7.1 gm/dL / ALK PHOS: 74 U/L / ALT: 41 U/L / AST: 45 U/L / GGT: x               MEDICATIONS  (STANDING):  aspirin enteric coated 81 milliGRAM(s) Oral daily  atorvastatin 40 milliGRAM(s) Oral at bedtime  sodium chloride 0.9%. 1000 milliLiter(s) (100 mL/Hr) IV Continuous <Continuous>    MEDICATIONS  (PRN):  ondansetron Injectable 4 milliGRAM(s) IV Push every 6 hours PRN Nausea  oxycodone    5 mG/acetaminophen 325 mG 1 Tablet(s) Oral every 4 hours PRN Moderate Pain (4 - 6)  oxycodone    5 mG/acetaminophen 325 mG 2 Tablet(s) Oral every 6 hours PRN Severe Pain (7 - 10)      RADIOLOGY & ADDITIONAL TESTS:    < from: CT Abdomen and Pelvis w/ IV Cont (07.07.20 @ 18:58) >  IMPRESSION:     Right lower lobe opacity as described above; differential includes infectious/inflammatory etiologies, atelectasis or a pulmonary contusion.    No additional acute intrathoracic or intra-abdominal sequelae of trauma.    < end of copied text >

## 2020-07-09 ENCOUNTER — TRANSCRIPTION ENCOUNTER (OUTPATIENT)
Age: 60
End: 2020-07-09
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
## 2020-07-09 DIAGNOSIS — S27.321A CONTUSION OF LUNG, UNILATERAL, INITIAL ENCOUNTER: ICD-10-CM

## 2020-07-09 DIAGNOSIS — S89.91XA UNSPECIFIED INJURY OF RIGHT LOWER LEG, INITIAL ENCOUNTER: ICD-10-CM

## 2020-07-09 LAB
ANION GAP SERPL CALC-SCNC: 5 MMOL/L — SIGNIFICANT CHANGE UP (ref 5–17)
APTT BLD: 30.5 SEC — SIGNIFICANT CHANGE UP (ref 27.5–35.5)
BUN SERPL-MCNC: 14 MG/DL — SIGNIFICANT CHANGE UP (ref 7–23)
CALCIUM SERPL-MCNC: 8.1 MG/DL — LOW (ref 8.5–10.1)
CHLORIDE SERPL-SCNC: 109 MMOL/L — HIGH (ref 96–108)
CO2 SERPL-SCNC: 26 MMOL/L — SIGNIFICANT CHANGE UP (ref 22–31)
CREAT SERPL-MCNC: 1.15 MG/DL — SIGNIFICANT CHANGE UP (ref 0.5–1.3)
GLUCOSE SERPL-MCNC: 99 MG/DL — SIGNIFICANT CHANGE UP (ref 70–99)
HCT VFR BLD CALC: 43.7 % — SIGNIFICANT CHANGE UP (ref 39–50)
HGB BLD-MCNC: 14.6 G/DL — SIGNIFICANT CHANGE UP (ref 13–17)
INR BLD: 1.1 RATIO — SIGNIFICANT CHANGE UP (ref 0.88–1.16)
MCHC RBC-ENTMCNC: 30.5 PG — SIGNIFICANT CHANGE UP (ref 27–34)
MCHC RBC-ENTMCNC: 33.4 GM/DL — SIGNIFICANT CHANGE UP (ref 32–36)
MCV RBC AUTO: 91.4 FL — SIGNIFICANT CHANGE UP (ref 80–100)
PLATELET # BLD AUTO: 197 K/UL — SIGNIFICANT CHANGE UP (ref 150–400)
POTASSIUM SERPL-MCNC: 4 MMOL/L — SIGNIFICANT CHANGE UP (ref 3.5–5.3)
POTASSIUM SERPL-SCNC: 4 MMOL/L — SIGNIFICANT CHANGE UP (ref 3.5–5.3)
PROTHROM AB SERPL-ACNC: 12.8 SEC — SIGNIFICANT CHANGE UP (ref 10.6–13.6)
RBC # BLD: 4.78 M/UL — SIGNIFICANT CHANGE UP (ref 4.2–5.8)
RBC # FLD: 12.7 % — SIGNIFICANT CHANGE UP (ref 10.3–14.5)
SODIUM SERPL-SCNC: 140 MMOL/L — SIGNIFICANT CHANGE UP (ref 135–145)
WBC # BLD: 8.37 K/UL — SIGNIFICANT CHANGE UP (ref 3.8–10.5)
WBC # FLD AUTO: 8.37 K/UL — SIGNIFICANT CHANGE UP (ref 3.8–10.5)

## 2020-07-09 PROCEDURE — 99232 SBSQ HOSP IP/OBS MODERATE 35: CPT

## 2020-07-09 RX ORDER — ACETAMINOPHEN 500 MG
650 TABLET ORAL ONCE
Refills: 0 | Status: COMPLETED | OUTPATIENT
Start: 2020-07-09 | End: 2020-07-09

## 2020-07-09 RX ORDER — OXYCODONE HYDROCHLORIDE 5 MG/1
10 TABLET ORAL EVERY 4 HOURS
Refills: 0 | Status: DISCONTINUED | OUTPATIENT
Start: 2020-07-09 | End: 2020-07-10

## 2020-07-09 RX ORDER — ONDANSETRON 8 MG/1
4 TABLET, FILM COATED ORAL EVERY 6 HOURS
Refills: 0 | Status: DISCONTINUED | OUTPATIENT
Start: 2020-07-09 | End: 2020-07-10

## 2020-07-09 RX ORDER — MEPERIDINE HYDROCHLORIDE 50 MG/ML
12.5 INJECTION INTRAMUSCULAR; INTRAVENOUS; SUBCUTANEOUS
Refills: 0 | Status: DISCONTINUED | OUTPATIENT
Start: 2020-07-09 | End: 2020-07-09

## 2020-07-09 RX ORDER — SODIUM CHLORIDE 9 MG/ML
1000 INJECTION, SOLUTION INTRAVENOUS
Refills: 0 | Status: DISCONTINUED | OUTPATIENT
Start: 2020-07-09 | End: 2020-07-09

## 2020-07-09 RX ORDER — OXYCODONE HYDROCHLORIDE 5 MG/1
5 TABLET ORAL EVERY 4 HOURS
Refills: 0 | Status: DISCONTINUED | OUTPATIENT
Start: 2020-07-09 | End: 2020-07-10

## 2020-07-09 RX ORDER — OXYCODONE HYDROCHLORIDE 5 MG/1
5 TABLET ORAL ONCE
Refills: 0 | Status: DISCONTINUED | OUTPATIENT
Start: 2020-07-09 | End: 2020-07-09

## 2020-07-09 RX ORDER — SODIUM CHLORIDE 9 MG/ML
1000 INJECTION INTRAMUSCULAR; INTRAVENOUS; SUBCUTANEOUS
Refills: 0 | Status: DISCONTINUED | OUTPATIENT
Start: 2020-07-09 | End: 2020-07-10

## 2020-07-09 RX ORDER — TRAMADOL HYDROCHLORIDE 50 MG/1
50 TABLET ORAL EVERY 6 HOURS
Refills: 0 | Status: DISCONTINUED | OUTPATIENT
Start: 2020-07-09 | End: 2020-07-10

## 2020-07-09 RX ORDER — ATORVASTATIN CALCIUM 80 MG/1
40 TABLET, FILM COATED ORAL AT BEDTIME
Refills: 0 | Status: DISCONTINUED | OUTPATIENT
Start: 2020-07-09 | End: 2020-07-10

## 2020-07-09 RX ORDER — HYDROMORPHONE HYDROCHLORIDE 2 MG/ML
0.5 INJECTION INTRAMUSCULAR; INTRAVENOUS; SUBCUTANEOUS
Refills: 0 | Status: DISCONTINUED | OUTPATIENT
Start: 2020-07-09 | End: 2020-07-09

## 2020-07-09 RX ORDER — ONDANSETRON 8 MG/1
4 TABLET, FILM COATED ORAL ONCE
Refills: 0 | Status: DISCONTINUED | OUTPATIENT
Start: 2020-07-09 | End: 2020-07-09

## 2020-07-09 RX ORDER — ASPIRIN/CALCIUM CARB/MAGNESIUM 324 MG
325 TABLET ORAL
Refills: 0 | Status: DISCONTINUED | OUTPATIENT
Start: 2020-07-10 | End: 2020-07-10

## 2020-07-09 RX ORDER — FENTANYL CITRATE 50 UG/ML
50 INJECTION INTRAVENOUS
Refills: 0 | Status: DISCONTINUED | OUTPATIENT
Start: 2020-07-09 | End: 2020-07-09

## 2020-07-09 RX ADMIN — SODIUM CHLORIDE 75 MILLILITER(S): 9 INJECTION, SOLUTION INTRAVENOUS at 00:05

## 2020-07-09 RX ADMIN — OXYCODONE HYDROCHLORIDE 10 MILLIGRAM(S): 5 TABLET ORAL at 21:11

## 2020-07-09 RX ADMIN — Medication 650 MILLIGRAM(S): at 06:44

## 2020-07-09 NOTE — DISCHARGE NOTE PROVIDER - NSDCFUADDINST_GEN_ALL_CORE_FT
ORIF DC Instructions:    1.	Analgesia PRN pain  2.	Non-Weight Bearing  Right Lower Extremity, in bulky hartley knee immobilizer  3.	Continue DVT/PE Prophylaxis. EC ASpirin 325 x 2 weeks  4. Take Protonix while on Lovenox  5.	Out of Bed Daily, try not to sit around.  6.	Follow up with Orthopedic Surgeon Dr. Andrews in 14 Days. Call Office For Appointment. Repeat x-rays in office.  7.	RN to Remove Staples/Sutures at Rehab Post-Op Day 14 (7/23) if they are accessible ie. not coverd by cast or splint.   VERSUS  MD will need to Remove staples/sutures in office POD14 (**date).  8.	Elevate the extremity as much as possible  9.	Keep bandage/Splint Clean and dry. Do not get it wet. Do not walk or put any body weight on splint because it will break. Take all appropriate fall risk precautions  Please seek immediate medical attention for chest pain, shortness of breath, any adverse changes to health    OR[facility] DC Instructions:    1.	Analgesia PRN pain  2.	Non-Weight Bearing  Right Lower Extremity, in bulky hartley knee immobilizer  3.	Continue DVT/PE Prophylaxis. EC ASpirin 325 x 2 weeks  4. Take Protonix while on Lovenox  5.	Out of Bed Daily, try not to sit around.  6.	Follow up with Orthopedic Surgeon Dr. Andrews in 14 Days. Call Office For Appointment. Repeat x-rays in office.  7.	RN to Remove Staples/Sutures at Rehab Post-Op Day 14 (7/23) if they are accessible ie. not coverd by cast or splint.   VERSUS  MD will need to Remove staples/sutures in office POD14 (**date).  8.	Elevate the extremity as much as possible  9.	Keep bandage/Splint Clean and dry. Do not get it wet. Do not walk or put any body weight on splint because it will break. Take all appropriate fall risk precautions  Please seek immediate medical attention for chest pain, shortness of breath, any adverse changes to health    Patella Tendon Repair DC Instructions:    1.	Analgesia PRN pain  2.	Non-Weight Bearing  Right Lower Extremity, in bulky hartley knee immobilizer  3.	Continue DVT/PE Prophylaxis. EC ASpirin 325 x 2 weeks  4. Take Protonix while on Aspirin  5.	Out of Bed Daily, try not to sit around.  6.	Follow up with Orthopedic Surgeon Dr. Andrews in 14 Days. Call Office For Appointment. Repeat x-rays in office.  7.	RN to Remove Staples/Sutures at Rehab Post-Op Day 14 (7/23) if they are accessible ie. not coverd by cast or splint.   VERSUS  MD will need to Remove staples/sutures in office POD14 or when wound is healed.  8.	Elevate the extremity as much as possible  9.	Keep bandage/Splint Clean and dry. Do not get it wet.

## 2020-07-09 NOTE — PROGRESS NOTE ADULT - ASSESSMENT
S/P Rt patellar tendon repair.    Plan[facility]  [facility]  Pain management  Knee immobilizer.  DVT prophylaxis  F/U labs  Ice and elevation Rt knee  Incentive spirometry  NV and compartments check RLE  May discharge home or rehab tomorrow and follow as outpatient if stable and cleared by medicine

## 2020-07-09 NOTE — PROGRESS NOTE ADULT - ASSESSMENT
PROBLEMS:    S/P FALL with right patellar tendon rupture  Renal Insufficiency with elevated CK in setting of trauma-improved  Opacity of RLL likely due to trauma/pulmonary contusion  HTN  CAD    plan:    pulmonary stable  pulmonary contusion minimal, possible atelectasis, there is no absolute contraindication for plan procedure  pain control  incentive spirometry  supportive care  dvt prophylasix

## 2020-07-09 NOTE — DISCHARGE NOTE PROVIDER - NSDCACTIVITY_GEN_ALL_CORE
Walking - Outdoors allowed/Walking - Indoors allowed Walking - Outdoors allowed/No heavy lifting/straining/Do not drive or operate machinery/Walking - Indoors allowed/Do not make important decisions/Showering allowed

## 2020-07-09 NOTE — PROGRESS NOTE ADULT - ASSESSMENT
A: 60M s/p RIght patella tendon repair     P:  WBAT RLE in KI   no ROM Right knee  pain control   cold compress  DVT ppx  postop abx   venodynes  incentive spirometer  follow labs   d/c planning

## 2020-07-09 NOTE — DISCHARGE NOTE PROVIDER - HOSPITAL COURSE
Pt s/p trauma, right patella tendon tear, lung contusion. Pt s/p orthopedic repair of tendon. pt stable throughout hospital stay and admission Pt s/p trauma, right patella tendon tear, lung contusion. Pt s/p orthopedic repair of tendon. pt stable throughout hospital stay and admission    Pt under care of orthopedic, medical service, pulmonary, trauma service throughout hospital stay and admission        32 minutes spent on discharge summary and coordination of care

## 2020-07-09 NOTE — DISCHARGE NOTE PROVIDER - PROVIDER TOKENS
PROVIDER:[TOKEN:[[id number]]] PROVIDER:[TOKEN:[[id number]]],PROVIDER:[TOKEN:[[id number]],FOLLOWUP:[1 week]],FREE:[LAST:[Primary Medical Doctor],PHONE:[(   )    -],FAX:[(   )    -],FOLLOWUP:[1 week]]

## 2020-07-09 NOTE — DISCHARGE NOTE PROVIDER - NSDCMRMEDTOKEN_GEN_ALL_CORE_FT
aspirin 81 mg oral tablet: 1 tab(s) orally once a day  ezetimibe 10 mg oral tablet: 1 tab(s) orally once a day  irbesartan 75 mg oral tablet: 1 tab(s) orally once a day  rosuvastatin 10 mg oral tablet: 1 tab(s) orally once a day aspirin 81 mg oral tablet: 1 tab(s) orally once a day  ezetimibe 10 mg oral tablet: 1 tab(s) orally once a day  irbesartan 75 mg oral tablet: 1 tab(s) orally once a day  oxyCODONE 5 mg oral tablet: 1 tab(s) orally every 4 hours, As Needed -for moderate pain MDD:6 tabs   rosuvastatin 10 mg oral tablet: 1 tab(s) orally once a day aspirin 325 mg oral delayed release tablet: 1 tab(s) orally 2 times a day  ezetimibe 10 mg oral tablet: 1 tab(s) orally once a day  irbesartan 75 mg oral tablet: 1 tab(s) orally once a day  oxyCODONE 5 mg oral tablet: 1 tab(s) orally every 4 hours, As Needed -for moderate pain MDD:6 tabs   rosuvastatin 10 mg oral tablet: 1 tab(s) orally once a day aspirin 325 mg oral delayed release tablet: 1 tab(s) orally 2 times a day  ezetimibe 10 mg oral tablet: 1 tab(s) orally once a day  irbesartan 75 mg oral tablet: 1 tab(s) orally once a day  oxyCODONE 5 mg oral tablet: 1 tab(s) orally every 4 hours, As Needed -for moderate pain MDD:6 tabs   pantoprazole 40 mg oral delayed release tablet: 1 tab(s) orally once a day   rosuvastatin 10 mg oral tablet: 1 tab(s) orally once a day

## 2020-07-09 NOTE — DISCHARGE NOTE PROVIDER - NSDCCPCAREPLAN_GEN_ALL_CORE_FT
PRINCIPAL DISCHARGE DIAGNOSIS  Diagnosis: Contusion of right lung, initial encounter  Assessment and Plan of Treatment:       SECONDARY DISCHARGE DIAGNOSES  Diagnosis: Fall from ladder, initial encounter  Assessment and Plan of Treatment:     Diagnosis: Knee injury, right, initial encounter  Assessment and Plan of Treatment:

## 2020-07-09 NOTE — DISCHARGE NOTE PROVIDER - CARE PROVIDER_API CALL
Ivan Anderson  ORTHOPAEDIC SURGERY  [address] [place] [address]  Phone: [phone]  Fax: [phone]  Follow Up Time: Ivan Anderson  ORTHOPAEDIC SURGERY  [address] [place] [address]  Phone: [phone]  Fax: [phone]  Follow Up Time:     Fransisco Carey  CRITICAL CARE MEDICINE  [address] [place] [id number]  Phone: [phone]  Fax: [phone]  Follow Up Time: 1 week    Primary Medical Doctor,   Phone: (   )    -  Fax: (   )    -  Follow Up Time: 1 week

## 2020-07-09 NOTE — PROGRESS NOTE ADULT - ASSESSMENT
A/P: 60M with R patellar tendon rupture  Plan for OR today with dr. Anderson for repair of patellar tendon  NPO except meds  IVFs while NPO  Hold chem dvt ppx  Medical optimization for OR  SCDs  WBAT RLE In knee immobilizer with assistance  Analgesia  Will discuss with Dr. Anderson and advise if plan changes

## 2020-07-10 ENCOUNTER — TRANSCRIPTION ENCOUNTER (OUTPATIENT)
Age: 60
End: 2020-07-10

## 2020-07-10 VITALS — TEMPERATURE: 98 F

## 2020-07-10 LAB
ANION GAP SERPL CALC-SCNC: 2 MMOL/L — LOW (ref 5–17)
BUN SERPL-MCNC: 18 MG/DL — SIGNIFICANT CHANGE UP (ref 7–23)
CALCIUM SERPL-MCNC: 8.5 MG/DL — SIGNIFICANT CHANGE UP (ref 8.5–10.1)
CHLORIDE SERPL-SCNC: 107 MMOL/L — SIGNIFICANT CHANGE UP (ref 96–108)
CO2 SERPL-SCNC: 28 MMOL/L — SIGNIFICANT CHANGE UP (ref 22–31)
CREAT SERPL-MCNC: 1.27 MG/DL — SIGNIFICANT CHANGE UP (ref 0.5–1.3)
GLUCOSE SERPL-MCNC: 122 MG/DL — HIGH (ref 70–99)
HCT VFR BLD CALC: 45.9 % — SIGNIFICANT CHANGE UP (ref 39–50)
HGB BLD-MCNC: 14.8 G/DL — SIGNIFICANT CHANGE UP (ref 13–17)
MCHC RBC-ENTMCNC: 29.6 PG — SIGNIFICANT CHANGE UP (ref 27–34)
MCHC RBC-ENTMCNC: 32.2 GM/DL — SIGNIFICANT CHANGE UP (ref 32–36)
MCV RBC AUTO: 91.8 FL — SIGNIFICANT CHANGE UP (ref 80–100)
PLATELET # BLD AUTO: 218 K/UL — SIGNIFICANT CHANGE UP (ref 150–400)
POTASSIUM SERPL-MCNC: 4.5 MMOL/L — SIGNIFICANT CHANGE UP (ref 3.5–5.3)
POTASSIUM SERPL-SCNC: 4.5 MMOL/L — SIGNIFICANT CHANGE UP (ref 3.5–5.3)
RBC # BLD: 5 M/UL — SIGNIFICANT CHANGE UP (ref 4.2–5.8)
RBC # FLD: 12.6 % — SIGNIFICANT CHANGE UP (ref 10.3–14.5)
SODIUM SERPL-SCNC: 137 MMOL/L — SIGNIFICANT CHANGE UP (ref 135–145)
WBC # BLD: 12.74 K/UL — HIGH (ref 3.8–10.5)
WBC # FLD AUTO: 12.74 K/UL — HIGH (ref 3.8–10.5)

## 2020-07-10 PROCEDURE — 99232 SBSQ HOSP IP/OBS MODERATE 35: CPT

## 2020-07-10 PROCEDURE — 99239 HOSP IP/OBS DSCHRG MGMT >30: CPT

## 2020-07-10 RX ORDER — OXYCODONE HYDROCHLORIDE 5 MG/1
1 TABLET ORAL
Qty: 30 | Refills: 0
Start: 2020-07-10 | End: 2020-07-14

## 2020-07-10 RX ORDER — ASPIRIN/CALCIUM CARB/MAGNESIUM 324 MG
1 TABLET ORAL
Qty: 0 | Refills: 0 | DISCHARGE
Start: 2020-07-10

## 2020-07-10 RX ORDER — PANTOPRAZOLE SODIUM 20 MG/1
1 TABLET, DELAYED RELEASE ORAL
Qty: 14 | Refills: 0
Start: 2020-07-10

## 2020-07-10 RX ORDER — ASPIRIN/CALCIUM CARB/MAGNESIUM 324 MG
1 TABLET ORAL
Qty: 0 | Refills: 0 | DISCHARGE

## 2020-07-10 RX ADMIN — OXYCODONE HYDROCHLORIDE 5 MILLIGRAM(S): 5 TABLET ORAL at 15:58

## 2020-07-10 RX ADMIN — TRAMADOL HYDROCHLORIDE 50 MILLIGRAM(S): 50 TABLET ORAL at 12:53

## 2020-07-10 RX ADMIN — OXYCODONE HYDROCHLORIDE 5 MILLIGRAM(S): 5 TABLET ORAL at 09:47

## 2020-07-10 RX ADMIN — Medication 325 MILLIGRAM(S): at 09:46

## 2020-07-10 NOTE — PROGRESS NOTE ADULT - SUBJECTIVE AND OBJECTIVE BOX
CC: Fall    HPI:  59 yo male with pmh/o HTN, Vitiligo, CAD s/p PCI x 2, MI, who presented to Ed after falling from ladder and sustained right patellar tendon rupture with likely pulmonary contusion given trauma as found on imaging.  Pt states he did not have any symptoms prior to falling off ladder, states he was 'not careful and fell'. Denies any sob, cough, cp, palpitations, diaphoresis, parasthesias, incontinence, HA, n/v/d, HA. Of note, pt has been off anti HTN and HLD medication for one month due to COVID limiting his ability to see his PMD. Pt lives in both [place] and [place] and cardiologist Dr. Vazquez is in [place]. PMD Dr. Huston.       : pt seen and examined this am. Felt well. No acute overnight events. Pain controlled. Feels much better than yesterday.   No sob/chest pain.    ROS: all 10 systems reviewed and is as above otherwise negative.     Vital Signs Last 24 Hrs  T(C): 36.8 (2020 07:50), Max: 37.2 (2020 05:22)  T(F): 98.3 (2020 07:50), Max: 99 (2020 05:22)  HR: 62 (2020 07:50) (55 - 67)  BP: 132/79 (2020 07:50) (132/79 - 136/78)  RR: 19 (2020 07:50) (19 - 19)  SpO2: 95% (2020 07:50) (95% - 98%)    PHYSICAL EXAM:    GENERAL: Comfortable, no acute distress   HEAD:  Normocephalic, atraumatic  EYES: EOMI, PERRLA  HEENT: Moist mucous membranes  NECK: Supple, No JVD  NERVOUS SYSTEM:  Alert & Oriented X3, grossly non focal.   CHEST/LUNG: Clear to auscultation bilaterally  HEART: Regular rate and rhythm  ABDOMEN: Soft, Nontender, Nondistended, Bowel sounds present  GENITOURINARY: Voiding, no palpable bladder  EXTREMITIES:   No clubbing, cyanosis, or edema  MUSCULOSKELETAL-RLE dressing intact  SKIN-no rash. +vitiligo.    RADIOLOGY & ADDITIONAL TESTS:    < from: CT Abdomen and Pelvis w/ IV Cont (20 @ 18:58) >  IMPRESSION:     Right lower lobe opacity as described above; differential includes infectious/inflammatory etiologies, atelectasis or a pulmonary contusion.    No additional acute intrathoracic or intra-abdominal sequelae of trauma.    LABS:                        14.6   8.37  )-----------( 197      ( 2020 06:04 )             43.7     07-09    140  |  109<H>  |  14  ----------------------------<  99  4.0   |  26  |  1.15    Ca    8.1<L>      2020 06:04    TPro  7.1  /  Alb  3.5  /  TBili  0.4  /  DBili  x   /  AST  45<H>  /  ALT  41  /  AlkPhos  74  07-07    PT/INR - ( 2020 06:04 )   PT: 12.8 sec;   INR: 1.10 ratio         PTT - ( 2020 06:04 )  PTT:30.5 sec  Urinalysis Basic - ( 2020 04:55 )    Color: Yellow / Appearance: Clear / S.015 / pH: x  Gluc: x / Ketone: Negative  / Bili: Negative / Urobili: Negative mg/dL   Blood: x / Protein: Negative mg/dL / Nitrite: Negative   Leuk Esterase: Negative / RBC: x / WBC x   Sq Epi: x / Non Sq Epi: x / Bacteria: x      CARDIAC MARKERS ( 2020 18:36 )  x     / x     / 664 U/L / x     / x        MEDICATIONS  (STANDING):  aspirin enteric coated 81 milliGRAM(s) Oral daily  atorvastatin 40 milliGRAM(s) Oral at bedtime  lactated ringers. 1000 milliLiter(s) (75 mL/Hr) IV Continuous <Continuous>  sodium chloride 0.9%. 1000 milliLiter(s) (100 mL/Hr) IV Continuous <Continuous>    MEDICATIONS  (PRN):  ondansetron Injectable 4 milliGRAM(s) IV Push every 6 hours PRN Nausea  oxycodone    5 mG/acetaminophen 325 mG 1 Tablet(s) Oral every 4 hours PRN Moderate Pain (4 - 6)  oxycodone    5 mG/acetaminophen 325 mG 2 Tablet(s) Oral every 6 hours PRN Severe Pain (7 - 10)    ASSESSMENT AND PLAN:  58M, PMH as above a/w:    1. Right patellar tendon rupture:  -no medical contraindications for OR at this time.   -pain control  -physical therapy post op  -incentive spirometry  -bowel regimen.     2. Pulmonary contusion:  -pulm following.     3. Renal insufficiency:  -unknown baseline.   -improved.     4. HTN:  -bp stable off arb.  -monitor.     5. CAD/Stents:  -antiplatelet/statin.    6. Hepatic lesions:  -outpt f/u  -too small to characterize.    7. DVT px  -start post op
Subjective:    pat better, COVID 19 neg, waiting ortho surgery.    Home Medications:  aspirin 81 mg oral tablet: 1 tab(s) orally once a day (2020 03:50)  ezetimibe 10 mg oral tablet: 1 tab(s) orally once a day (2020 03:50)  irbesartan 75 mg oral tablet: 1 tab(s) orally once a day (2020 03:50)  rosuvastatin 10 mg oral tablet: 1 tab(s) orally once a day (2020 03:50)    MEDICATIONS  (STANDING):  aspirin enteric coated 81 milliGRAM(s) Oral daily  atorvastatin 40 milliGRAM(s) Oral at bedtime  lactated ringers. 1000 milliLiter(s) (75 mL/Hr) IV Continuous <Continuous>  sodium chloride 0.9%. 1000 milliLiter(s) (100 mL/Hr) IV Continuous <Continuous>    MEDICATIONS  (PRN):  ondansetron Injectable 4 milliGRAM(s) IV Push every 6 hours PRN Nausea  oxycodone    5 mG/acetaminophen 325 mG 1 Tablet(s) Oral every 4 hours PRN Moderate Pain (4 - 6)  oxycodone    5 mG/acetaminophen 325 mG 2 Tablet(s) Oral every 6 hours PRN Severe Pain (7 - 10)      Allergies    penicillins (Unknown)    Intolerances        Vital Signs Last 24 Hrs  T(C): 36.8 (2020 07:50), Max: 37.2 (2020 05:22)  T(F): 98.3 (2020 07:50), Max: 99 (2020 05:22)  HR: 62 (2020 07:50) (55 - 67)  BP: 132/79 (2020 07:50) (132/79 - 136/78)  BP(mean): --  RR: 19 (2020 07:50) (19 - 19)  SpO2: 95% (2020 07:50) (95% - 98%)      PHYSICAL EXAMINATION:    NECK:  Supple. No lymphadenopathy. Jugular venous pressure not elevated. Carotids equal.   HEART:   The cardiac impulse has a normal quality. Reg., Nl S1 and S2.  There are no murmurs, rubs or gallops noted  CHEST:  Chest is clear to auscultation. Normal respiratory effort.  ABDOMEN:  Soft and nontender.   EXTREMITIES:  There is no edema.       LABS:                        14.6   8.37  )-----------( 197      ( 2020 06:04 )             43.7     07-09    140  |  109<H>  |  14  ----------------------------<  99  4.0   |  26  |  1.15    Ca    8.1<L>      2020 06:04    TPro  7.1  /  Alb  3.5  /  TBili  0.4  /  DBili  x   /  AST  45<H>  /  ALT  41  /  AlkPhos  74  07-07    PT/INR - ( 2020 06:04 )   PT: 12.8 sec;   INR: 1.10 ratio         PTT - ( 2020 06:04 )  PTT:30.5 sec  Urinalysis Basic - ( 2020 04:55 )    Color: Yellow / Appearance: Clear / S.015 / pH: x  Gluc: x / Ketone: Negative  / Bili: Negative / Urobili: Negative mg/dL   Blood: x / Protein: Negative mg/dL / Nitrite: Negative   Leuk Esterase: Negative / RBC: x / WBC x   Sq Epi: x / Non Sq Epi: x / Bacteria: x        COVID-19 PCR: NotDetec (2020 21:13)
57 yo male with pmh/o HTN, Vitiligo, CAD s/p PCI x 2, MI, who presented to Ed after falling from ladder and sustained right patellar tendon rupture with likely pulmonary contusion given trauma as found on imaging. Pt states he did not have any symptoms prior to falling off ladder, states he was 'not careful and fell'. Denies any sob, cough, cp, palpitations, diaphoresis, parasthesias, incontinence, HA, n/v/d, HA. Of note, pt has been off anti HTN and HLD medication for one month due to COVID limiting his ability to see his PMD. Pt lives in both [place] and [place] and cardiologist Dr. Vazquez is in [place]. PMD Dr. Huston.   7/9 Awaiting Orthopedic repair today. No acute issues.    Vital Signs Last 24 Hrs  T(C): 36.8 (09 Jul 2020 07:50), Max: 37.2 (09 Jul 2020 05:22)  T(F): 98.3 (09 Jul 2020 07:50), Max: 99 (09 Jul 2020 05:22)  HR: 62 (09 Jul 2020 07:50) (55 - 67)  BP: 132/79 (09 Jul 2020 07:50) (132/79 - 136/78)  RR: 19 (09 Jul 2020 07:50) (19 - 19)  SpO2: 95% (09 Jul 2020 07:50) (95% - 98%)
CC: Fall    HPI:  57 yo male with pmh/o HTN, Vitiligo, CAD s/p PCI x 2, MI, who presented to Ed after falling from ladder and sustained right patellar tendon rupture with likely pulmonary contusion given trauma as found on imaging.  Pt states he did not have any symptoms prior to falling off ladder, states he was 'not careful and fell'. Denies any sob, cough, cp, palpitations, diaphoresis, parasthesias, incontinence, HA, n/v/d, HA. Of note, pt has been off anti HTN and HLD medication for one month due to COVID limiting his ability to see his PMD. Pt lives in both [place] and [place] and cardiologist Dr. Vazquez is in [place]. PMD Dr. Huston.     7/10: pt seen and examined this am. Felt well. Pain controlled. Was awaiting physical therapy. No sob/chest pain. tolerating po. No difficulty voiding.       ROS: all 10 systems reviewed and is as above otherwise negative.     Vital Signs Last 24 Hrs  T(C): 36.4 (10 Jul 2020 08:25), Max: 36.9 (09 Jul 2020 15:43)  T(F): 97.6 (10 Jul 2020 08:25), Max: 98.5 (09 Jul 2020 15:43)  HR: 54 (10 Jul 2020 08:25) (54 - 79)  BP: 147/87 (10 Jul 2020 08:25) (118/79 - 147/87)  BP(mean): 86 (09 Jul 2020 20:20) (86 - 99)  RR: 17 (10 Jul 2020 08:25) (11 - 18)  SpO2: 100% (10 Jul 2020 08:25) (96% - 100%)    PHYSICAL EXAM:    GENERAL: Comfortable, no acute distress   HEAD:  Normocephalic, atraumatic  EYES: EOMI, PERRLA  HEENT: Moist mucous membranes  NECK: Supple, No JVD  NERVOUS SYSTEM:  Alert & Oriented X3, grossly non focal.   CHEST/LUNG: Clear to auscultation bilaterally  HEART: Regular rate and rhythm  ABDOMEN: Soft, Nontender, Nondistended, Bowel sounds present  GENITOURINARY: Voiding, no palpable bladder  EXTREMITIES:   No clubbing, cyanosis, or edema  MUSCULOSKELETAL-RLE dressing intact  SKIN-no rash. +vitiligo.  LABS:                        14.8   12.74 )-----------( 218      ( 10 Jul 2020 05:30 )             45.9     07-10    137  |  107  |  18  ----------------------------<  122<H>  4.5   |  28  |  1.27    Ca    8.5      10 Jul 2020 05:30      PT/INR - ( 09 Jul 2020 06:04 )   PT: 12.8 sec;   INR: 1.10 ratio         PTT - ( 09 Jul 2020 06:04 )  PTT:30.5 sec    MEDICATIONS  (STANDING):  aspirin enteric coated 325 milliGRAM(s) Oral two times a day  atorvastatin 40 milliGRAM(s) Oral at bedtime  sodium chloride 0.9%. 1000 milliLiter(s) (100 mL/Hr) IV Continuous <Continuous>    MEDICATIONS  (PRN):  ondansetron Injectable 4 milliGRAM(s) IV Push every 6 hours PRN Nausea  oxyCODONE    IR 5 milliGRAM(s) Oral every 4 hours PRN Moderate Pain (4 - 6)  oxyCODONE    IR 10 milliGRAM(s) Oral every 4 hours PRN Severe Pain (7 - 10)  traMADol 50 milliGRAM(s) Oral every 6 hours PRN Mild Pain (1 - 3)    ASSESSMENT AND PLAN:  58M, PMH as above a/w:    1. Right patellar tendon rupture:  -s/p patellar tendon repair POD#1  -pain control  -physical therapy  -incentive spirometry  -bowel regimen.     2. Pulmonary contusion:  -pulm following.   -spo2 stable.    3. Renal insufficiency  -ruled out.    4. HTN:  -resume arb on dc.     5. CAD/Stents:  -antiplatelet/statin.    6. Hepatic lesions:  -outpt f/u  -too small to characterize.    7. DVT px    8. dispo:  dc planning
CC:Patient is a 60y old  Male who presents with a chief complaint of s/p fall (09 Jul 2020 13:33)      Subjective:  Pt seen and examined at bedside with chaperone. Pt is AAOx3, pt in no acute distress. Pt denied c/o fever, chills, chest pain, SOB, abd pain, N/V/D, hemoptysis, hematemesis, hematuria, hematochexia, headache, diplopia, vertigo, dizzyness. Pt tolerating diet, (+) void, (+) oob with assistance, (+) bowel function    ROS:  otherwise as abovementioned ROS    Vital Signs Last 24 Hrs  T(C): 36.4 (10 Jul 2020 08:25), Max: 36.9 (09 Jul 2020 15:43)  T(F): 97.6 (10 Jul 2020 08:25), Max: 98.5 (09 Jul 2020 15:43)  HR: 54 (10 Jul 2020 08:25) (54 - 79)  BP: 147/87 (10 Jul 2020 08:25) (118/79 - 147/87)  BP(mean): 86 (09 Jul 2020 20:20) (86 - 99)  RR: 17 (10 Jul 2020 08:25) (11 - 18)  SpO2: 100% (10 Jul 2020 08:25) (96% - 100%)    Labs:                                14.8   12.74 )-----------( 218      ( 10 Jul 2020 05:30 )             45.9     CBC Full  -  ( 10 Jul 2020 05:30 )  WBC Count : 12.74 K/uL  RBC Count : 5.00 M/uL  Hemoglobin : 14.8 g/dL  Hematocrit : 45.9 %  Platelet Count - Automated : 218 K/uL  Mean Cell Volume : 91.8 fl  Mean Cell Hemoglobin : 29.6 pg  Mean Cell Hemoglobin Concentration : 32.2 gm/dL  Auto Neutrophil # : x  Auto Lymphocyte # : x  Auto Monocyte # : x  Auto Eosinophil # : x  Auto Basophil # : x  Auto Neutrophil % : x  Auto Lymphocyte % : x  Auto Monocyte % : x  Auto Eosinophil % : x  Auto Basophil % : x    07-10    137  |  107  |  18  ----------------------------<  122<H>  4.5   |  28  |  1.27    Ca    8.5      10 Jul 2020 05:30        PT/INR - ( 09 Jul 2020 06:04 )   PT: 12.8 sec;   INR: 1.10 ratio         PTT - ( 09 Jul 2020 06:04 )  PTT:30.5 sec      Meds:  aspirin enteric coated 325 milliGRAM(s) Oral two times a day  atorvastatin 40 milliGRAM(s) Oral at bedtime  ondansetron Injectable 4 milliGRAM(s) IV Push every 6 hours PRN  oxyCODONE    IR 5 milliGRAM(s) Oral every 4 hours PRN  oxyCODONE    IR 10 milliGRAM(s) Oral every 4 hours PRN  sodium chloride 0.9%. 1000 milliLiter(s) IV Continuous <Continuous>  traMADol 50 milliGRAM(s) Oral every 6 hours PRN      Radiology:      Physical exam:  GCS of 15  Pt is aaox3  Pt in no acute distress  Airway is patent  Breathing is symmetric and unlabored  Neuro: CN II-XII grossly intact  Psych: normal affect  HEENT: normocephalic, LATRICE, EOM wnl, no gross craniofacial bony pathology to exam  Neck: No tracheal deviation, no JVD, no crepitus, no ecchymosis, no hematoma  Chest: No gross rib or sternal pathology or tenderness to exam, no crepitus, no ecchymosis, no hematoma  Resp: CTAB  CVS: S1S2(+)  ABD: bowel sounds (+), soft, nontender, non distended, no rebound, no guarding, no rigidity, no pelvic instability to exam  EXT: right lower ext in knee immobilizer per ortho, no gross calf tenderness or edema to exam, pt has good capillary refill in all digits. Sensoromotor function grossly intact to limited exam, on VTE prophylaxis  Skin: no adverse skin changes to exam
CC:Patient is a 60y old  Male who presents with a chief complaint of s/p fall/trauma (08 Jul 2020 11:26)      Subjective:  Pt seen and examined at bedside. Pt is AAOx3, pt in no acute distress. Pt states tolerated right lower leg/knee pain. Pt denied c/o fever, chills, chest pain, SOB, abd pain, N/V/D, hemoptysis, hematemesis, hematuria, hematochexia, headache, diplopia, vertigo, dizzyness.     ROS:  otherwise as abovementioned ROS    Vital Signs Last 24 Hrs  T(C): 36.8 (08 Jul 2020 07:44), Max: 37.1 (07 Jul 2020 18:09)  T(F): 98.3 (08 Jul 2020 07:44), Max: 98.8 (07 Jul 2020 18:09)  HR: 61 (08 Jul 2020 07:44) (49 - 81)  BP: 153/90 (08 Jul 2020 07:44) (147/88 - 177/99)  BP(mean): --  RR: 20 (08 Jul 2020 07:44) (18 - 20)  SpO2: 99% (08 Jul 2020 07:44) (97% - 100%)    Labs:      CARDIAC MARKERS ( 07 Jul 2020 18:36 )  x     / x     / 664 U/L / x     / x                                13.6   8.88  )-----------( 188      ( 08 Jul 2020 04:20 )             41.7     CBC Full  -  ( 08 Jul 2020 04:20 )  WBC Count : 8.88 K/uL  RBC Count : 4.59 M/uL  Hemoglobin : 13.6 g/dL  Hematocrit : 41.7 %  Platelet Count - Automated : 188 K/uL  Mean Cell Volume : 90.8 fl  Mean Cell Hemoglobin : 29.6 pg  Mean Cell Hemoglobin Concentration : 32.6 gm/dL  Auto Neutrophil # : x  Auto Lymphocyte # : x  Auto Monocyte # : x  Auto Eosinophil # : x  Auto Basophil # : x  Auto Neutrophil % : x  Auto Lymphocyte % : x  Auto Monocyte % : x  Auto Eosinophil % : x  Auto Basophil % : x    07-08    140  |  110<H>  |  16  ----------------------------<  93  3.6   |  25  |  1.11    Ca    8.1<L>      08 Jul 2020 04:20    TPro  7.1  /  Alb  3.5  /  TBili  0.4  /  DBili  x   /  AST  45<H>  /  ALT  41  /  AlkPhos  74  07-07    LIVER FUNCTIONS - ( 07 Jul 2020 18:36 )  Alb: 3.5 g/dL / Pro: 7.1 gm/dL / ALK PHOS: 74 U/L / ALT: 41 U/L / AST: 45 U/L / GGT: x           PT/INR - ( 08 Jul 2020 04:20 )   PT: 12.8 sec;   INR: 1.10 ratio         PTT - ( 08 Jul 2020 04:20 )  PTT:30.3 sec      Meds:  aspirin enteric coated 81 milliGRAM(s) Oral daily  atorvastatin 40 milliGRAM(s) Oral at bedtime  ondansetron Injectable 4 milliGRAM(s) IV Push every 6 hours PRN  oxycodone    5 mG/acetaminophen 325 mG 1 Tablet(s) Oral every 4 hours PRN  oxycodone    5 mG/acetaminophen 325 mG 2 Tablet(s) Oral every 6 hours PRN  sodium chloride 0.9%. 1000 milliLiter(s) IV Continuous <Continuous>      Radiology:  < from: MR Knee No Cont, Right (07.08.20 @ 09:56) >  EXAM:  MR KNEE RT                            PROCEDURE DATE:  07/08/2020          INTERPRETATION:  EXAMINATION: MRI of the right knee    HISTORY: Status post fall. Patellar tendon rupture.    TECHNIQUE: Multiplanar, multisequential MR imaging was performed.    FINDINGS:     Fluid: There is a moderate-sized joint effusion.    Ligaments: No tear of the cruciate or collateral ligaments.    Medial Compartment: There is horizontal tear of the posterior horn of the medial meniscus which contacts the undersurface. Articular cartilage is preserved.    Lateral Compartment: There is horizontal tear of the posterior horn the lateral meniscus with adjacent fraying of the free edge. There is deep chondral fissuring along the posteroinferior aspect of the lateral femoral condyle. Articular cartilage is otherwise preserved.    Patellofemoral Compartment: There is a deep chondral fissure along the medial patellar facet and superficial chondral fissuring along the lateral patellar facet. There is moderate grade chondral wear along the central trochlear sulcus with associated chondral fissuring.    Extensor Mechanism: There is a full-thickness, full width tear of the proximal patellar tendon. The tear occurs approximately 1.2 cm inferior to the inferior pole the patella. The proximal stump of the tendon is severely thickened, wavy, and lax. The gap between the torn proximal and distal stumps is approximately 0.3 cm. There is also an associated full-thickness tear of the medial patellofemoral ligament/medial patellofemoral retinaculum. The quadriceps tendon is intact. There is marked myofascial edema at the distal imaged portions of the vastus medialis and lateralis. There is patella nakul.    Bones: No fracture or osteonecrosis.    IMPRESSION:Full-thickness, full width tear of the proximal patellar tendon, as above.  Lateral and patellofemoral compartment chondrosis, as above.  Horizontal tears of the posterior horns of the medial and lateral menisci.                JARRELL CUTLER M.D. ATTENDING RADIOLOGIST  This document has been electronically signed. Jul 8 2020 10:13AM    < end of copied text >    < from: CT Chest w/ IV Cont (07.07.20 @ 18:58) >  EXAM:  CT ABDOMEN AND PELVIS IC                          EXAM:  CT CHEST IC                            PROCEDURE DATE:  07/07/2020          INTERPRETATION:  CLINICAL INFORMATION: 12 foot fall off of ladder. Hit back of head, back    COMPARISON: None.    PROCEDURE:   CT of the Chest, Abdomen and Pelvis was performed with intravenous contrast.   Imaging was performed through the chest in the arterial phase followed by imaging of the abdomen and pelvis in the portal venous phase.  Intravenous contrast: 90 ml Omnipaque 350. 10 ml discarded.  Oral contrast: None.  Sagittal and coronal reformats were performed.    FINDINGS:  CHEST:   LUNGS AND LARGE AIRWAYS: Patent central airways. Opacity at the medial aspect of the right lower lobe which is predominantly groundglass in attenuation and dense medially. Remainder of the lungs are clear.  PLEURA: No pleural effusion.  VESSELS: Thoracic aorta normal in caliber. Stent in the left anterior descending artery.   HEART: Heart size is normal. No pericardial effusion.  MEDIASTINUM AND SWATI: No lymphadenopathy.  CHEST WALL AND LOWER NECK: No enlarged axillary lymph nodes.    ABDOMEN AND PELVIS:  LIVER: Subcentimeter low-attenuation lesions in the left hepatic lobe, too small to characterize.  BILE DUCTS: Normal caliber.  GALLBLADDER: Within normal limits.  SPLEEN: Within normal limits.  PANCREAS: Within normal limits.  ADRENALS: Within normal limits.  KIDNEYS/URETERS: Within normal limits.    BLADDER: Within normal limits.  REPRODUCTIVE ORGANS: Prostate not enlarged.    BOWEL: Colonic diverticulosis without evidence of diverticulitis. No bowel obstruction. No evidence of appendicitis.  PERITONEUM: No ascites.  VESSELS: Abdominal aorta normal in caliber.  RETROPERITONEUM/LYMPH NODES: No lymphadenopathy.    ABDOMINAL WALL: Small fat-containing umbilical hernia.  BONES/SOFT TISSUES: Ossific densities at the anterior aspect of the right hip (series 3 image 106), possibly related to prior trauma. No acute fracture.    IMPRESSION:     Right lower lobe opacity as described above; differential includes infectious/inflammatory etiologies, atelectasis or a pulmonary contusion.    No additional acute intrathoracic or intra-abdominal sequelae of trauma.                  ROSELYN CHAUDHARY M.D., ATTENDING RADIOLOGIST  This document has been electronically signed. Jul 7 2020  7:34PM        < end of copied text >    Physical exam:  GCS of 15  Pt is aaox3  Pt in no acute distress  Airway is patent  Breathing is symmetric and unlabored  Neuro: CN II-XII grossly intact  Psych: normal affect  HEENT: normocephalic, LATRICE, EOM wnl, no gross craniofacial bony pathology to exam  Neck: No tracheal deviation, no JVD, no crepitus, no ecchymosis, no hematoma  Chest: No gross rib or sternal pathology or tenderness to exam, no crepitus, no ecchymosis, no hematoma  Resp: CTAB  CVS: S1S2(+)  ABD: bowel sounds (+), soft, nontender, non distended, no rebound, no guarding, no rigidity, no pelvic instability to exam  EXT: right lower ext in knee immobilizer per ortho, no gross calf tenderness or edema to exam, pt has good capillary refill in all digits. Sensoromotor function grossly intact to limited exam, on VTE prophylaxis  Skin: no adverse skin changes to exam
Patient seen and examined at bedside. No acute events over night. No acute complaints at this time. Pain well controlled. Denies chest pain, shortness of breath, nausea or vomiting.     PE:  Vital Signs Last 24 Hrs  T(C): 36.3 (07-09-20 @ 20:30), Max: 36.9 (07-09-20 @ 15:43)  T(F): 97.3 (07-09-20 @ 20:30), Max: 98.5 (07-09-20 @ 15:43)  HR: 61 (07-09-20 @ 20:30) (56 - 79)  BP: 130/78 (07-09-20 @ 20:30) (118/79 - 143/84)  BP(mean): 86 (07-09-20 @ 20:20) (86 - 99)  RR: 18 (07-09-20 @ 20:30) (11 - 19)  SpO2: 99% (07-09-20 @ 20:30) (95% - 100%)    General: NAD, resting comfortably in bed  R LE:   Dressing C/D/I in KI  SCDs present bilaterally  Compartments soft and compressible  No calf tenderness bilaterally  +TA/EHL/FHL/GSC  SILT L3-S1  2+ DP/PT                          14.8   12.74 )-----------( 218      ( 10 Jul 2020 05:30 )             45.9     09 Jul 2020 06:04    140    |  109    |  14     ----------------------------<  99     4.0     |  26     |  1.15     Ca    8.1        09 Jul 2020 06:04      PT/INR - ( 09 Jul 2020 06:04 )   PT: 12.8 sec;   INR: 1.10 ratio         PTT - ( 09 Jul 2020 06:04 )  PTT:30.5 sec    A/P:  60y m s/p R ptaellar tendon repair  -PT/OT - [facility]  -Pain Control  -DVT ppx w/ BID, ok to DC on it as well  -Continue perioperative abx x 24 hours  -FU AM Labs  -Rest, ice, compress and elevate the extremity as we needed  -Incentive Spirometry  -Medical management appreciated  -Ortho stable for DC  -Likely DC home today
Patient seen and examined in PACU  Pt complaining of Rt knee pain.    VS:  /67, puls 82, puls Ox 100%    PE:  knee immobilizer on place  Dressing D/C/I  NVI LLE  Compartments soft B/L LE  FHL/EHL/TA/GS intact RLE
Pt S/E at bedside, no acute events overnight, pain controlled    Vital Signs Last 24 Hrs  T(C): 37.2 (09 Jul 2020 05:22), Max: 37.2 (09 Jul 2020 05:22)  T(F): 99 (09 Jul 2020 05:22), Max: 99 (09 Jul 2020 05:22)  HR: 55 (09 Jul 2020 05:22) (55 - 67)  BP: 135/81 (09 Jul 2020 05:22) (135/81 - 153/90)  BP(mean): --  RR: 19 (08 Jul 2020 20:18) (19 - 20)  SpO2: 98% (09 Jul 2020 05:22) (97% - 99%)    Gen: NAD    Right Lower Extremity:  Dressing clean dry intact, +knee immobilizer  +EHL/FHL/TA/GS  SILT L3-S1  +DP/PT Pulses  Compartments soft  No calf TTP B/L
Pt seen and examined. Decided to stay for OR for repair of his right patellar tendon. No acute events overnight.    Vital Signs Last 24 Hrs  T(C): 36.8 (08 Jul 2020 07:44), Max: 37.1 (07 Jul 2020 18:09)  T(F): 98.3 (08 Jul 2020 07:44), Max: 98.8 (07 Jul 2020 18:09)  HR: 61 (08 Jul 2020 07:44) (49 - 81)  BP: 153/90 (08 Jul 2020 07:44) (147/88 - 177/99)  BP(mean): --  RR: 20 (08 Jul 2020 07:44) (18 - 20)  SpO2: 99% (08 Jul 2020 07:44) (97% - 100%)    PE:  Gen: NAD  RLE:  skin intact  compartments soft non tender  neg axial load  no pain with log roll in hip  TTP over patella  + palpable defect at patella tendon   unable to SLR   FROM ankle and toes  SILT   DP intact   In [facility]
Subjective:    pat better, no new complaint, lying in bed.    Home Medications:  aspirin 81 mg oral tablet: 1 tab(s) orally once a day (08 Jul 2020 03:50)  ezetimibe 10 mg oral tablet: 1 tab(s) orally once a day (08 Jul 2020 03:50)  irbesartan 75 mg oral tablet: 1 tab(s) orally once a day (08 Jul 2020 03:50)  rosuvastatin 10 mg oral tablet: 1 tab(s) orally once a day (08 Jul 2020 03:50)    MEDICATIONS  (STANDING):  aspirin enteric coated 325 milliGRAM(s) Oral two times a day  atorvastatin 40 milliGRAM(s) Oral at bedtime  sodium chloride 0.9%. 1000 milliLiter(s) (100 mL/Hr) IV Continuous <Continuous>    MEDICATIONS  (PRN):  ondansetron Injectable 4 milliGRAM(s) IV Push every 6 hours PRN Nausea  oxyCODONE    IR 5 milliGRAM(s) Oral every 4 hours PRN Moderate Pain (4 - 6)  oxyCODONE    IR 10 milliGRAM(s) Oral every 4 hours PRN Severe Pain (7 - 10)  traMADol 50 milliGRAM(s) Oral every 6 hours PRN Mild Pain (1 - 3)      Allergies    penicillins (Unknown)    Intolerances        Vital Signs Last 24 Hrs  T(C): 36.4 (10 Jul 2020 08:25), Max: 36.9 (09 Jul 2020 15:43)  T(F): 97.6 (10 Jul 2020 08:25), Max: 98.5 (09 Jul 2020 15:43)  HR: 54 (10 Jul 2020 08:25) (54 - 79)  BP: 147/87 (10 Jul 2020 08:25) (118/79 - 147/87)  BP(mean): 86 (09 Jul 2020 20:20) (86 - 99)  RR: 17 (10 Jul 2020 08:25) (11 - 18)  SpO2: 100% (10 Jul 2020 08:25) (96% - 100%)      PHYSICAL EXAMINATION:    NECK:  Supple. No lymphadenopathy. Jugular venous pressure not elevated. Carotids equal.   HEART:   The cardiac impulse has a normal quality. Reg., Nl S1 and S2.  There are no murmurs, rubs or gallops noted  CHEST:  Chest is clear to auscultation. Normal respiratory effort.  ABDOMEN:  Soft and nontender.   EXTREMITIES:  There is no edema.       LABS:                        14.8   12.74 )-----------( 218      ( 10 Jul 2020 05:30 )             45.9     07-10    137  |  107  |  18  ----------------------------<  122<H>  4.5   |  28  |  1.27    Ca    8.5      10 Jul 2020 05:30      PT/INR - ( 09 Jul 2020 06:04 )   PT: 12.8 sec;   INR: 1.10 ratio         PTT - ( 09 Jul 2020 06:04 )  PTT:30.5 sec
pt seen at bedside doing well, comfortable in NAD, pain controlled right knee, denies N/T RLE    PE right knee  dressing and immobilizer intact  FROM ankle and toes  + EHL FHL GS TA  SILT   DP 2+

## 2020-07-10 NOTE — PROGRESS NOTE ADULT - PROVIDER SPECIALTY LIST ADULT
Hospitalist
Orthopedics
Pulmonology
Surgery
Surgery
Trauma Surgery
Hospitalist
Pulmonology

## 2020-07-10 NOTE — PROGRESS NOTE ADULT - ASSESSMENT
PROBLEMS:    S/P FALL with right patellar tendon rupture  Renal Insufficiency with elevated CK in setting of trauma-improved  Opacity of RLL likely due to trauma/pulmonary contusion  HTN  CAD    plan:    pulmonary stable  fu as outpat for fu CT scan with h/o GGO  pain control  incentive spirometry  supportive care  dvt prophylasix

## 2020-07-10 NOTE — PHYSICAL THERAPY INITIAL EVALUATION ADULT - PERTINENT HX OF CURRENT PROBLEM, REHAB EVAL
Pt presented to ED after falling from ladder and sustained right patellar tendon rupture with likely pulmonary contusion, Pt states he did not have any symptoms prior to falling off ladder, states he was 'not careful and fell'.,Sustained Right patellar tendon rupture: ,s/p Rt Patellar tendon repair

## 2020-07-10 NOTE — PROGRESS NOTE ADULT - ASSESSMENT
A/P:  Right lung contusion, stable hemodynamics  Right patella tendon tear  Pt stable and cleared from trauma surgical standpoint  Diet as tolerated  GI/DVT prophylaxis  Pain control  Incentive spirometry  Pt hemodynamically stable  Cont current care and meds  Discharge planning,   Pt aware of and agrees with all of the above

## 2020-07-10 NOTE — DISCHARGE NOTE NURSING/CASE MANAGEMENT/SOCIAL WORK - PATIENT PORTAL LINK FT
You can access the FollowMyHealth Patient Portal offered by [facility] by registering at the following website: [email or web address]. By joining [employer]’s FollowMyHealth portal, you will also be able to view your health information using other applications (apps) compatible with our system.
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
## 2020-07-14 DIAGNOSIS — Z23 ENCOUNTER FOR IMMUNIZATION: ICD-10-CM

## 2020-07-14 DIAGNOSIS — I10 ESSENTIAL (PRIMARY) HYPERTENSION: ICD-10-CM

## 2020-07-14 DIAGNOSIS — I25.2 OLD MYOCARDIAL INFARCTION: ICD-10-CM
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
## 2020-07-14 DIAGNOSIS — E78.5 HYPERLIPIDEMIA, UNSPECIFIED: ICD-10-CM

## 2020-07-14 DIAGNOSIS — S27.321A CONTUSION OF LUNG, UNILATERAL, INITIAL ENCOUNTER: ICD-10-CM

## 2020-07-14 DIAGNOSIS — S76.111A STRAIN OF RIGHT QUADRICEPS MUSCLE, FASCIA AND TENDON, INITIAL ENCOUNTER: ICD-10-CM

## 2020-07-14 DIAGNOSIS — J98.11 ATELECTASIS: ICD-10-CM

## 2020-07-14 DIAGNOSIS — I25.10 ATHEROSCLEROTIC HEART DISEASE OF NATIVE CORONARY ARTERY WITHOUT ANGINA PECTORIS: ICD-10-CM

## 2020-07-14 DIAGNOSIS — N28.9 DISORDER OF KIDNEY AND URETER, UNSPECIFIED: ICD-10-CM

## 2020-07-14 DIAGNOSIS — Z95.5 PRESENCE OF CORONARY ANGIOPLASTY IMPLANT AND GRAFT: ICD-10-CM

## 2020-10-03 ENCOUNTER — TRANSCRIPTION ENCOUNTER (OUTPATIENT)
Age: 60
End: 2020-10-03

## 2021-05-17 PROBLEM — I21.9 ACUTE MYOCARDIAL INFARCTION, UNSPECIFIED: Chronic | Status: ACTIVE | Noted: 2020-07-07

## 2021-05-17 PROBLEM — E78.5 HYPERLIPIDEMIA, UNSPECIFIED: Chronic | Status: ACTIVE | Noted: 2020-07-07

## 2021-05-24 ENCOUNTER — APPOINTMENT (OUTPATIENT)
Dept: THORACIC SURGERY | Facility: CLINIC | Age: 61
End: 2021-05-24
Payer: COMMERCIAL

## 2021-05-24 VITALS
RESPIRATION RATE: 16 BRPM | DIASTOLIC BLOOD PRESSURE: 86 MMHG | WEIGHT: 215 LBS | SYSTOLIC BLOOD PRESSURE: 142 MMHG | OXYGEN SATURATION: 98 % | HEART RATE: 76 BPM | BODY MASS INDEX: 29.12 KG/M2 | HEIGHT: 72 IN

## 2021-05-24 DIAGNOSIS — R91.8 OTHER NONSPECIFIC ABNORMAL FINDING OF LUNG FIELD: ICD-10-CM

## 2021-05-24 PROCEDURE — 99072 ADDL SUPL MATRL&STAF TM PHE: CPT

## 2021-05-24 PROCEDURE — 99214 OFFICE O/P EST MOD 30 MIN: CPT

## 2021-05-24 NOTE — DATA REVIEWED
[FreeTextEntry1] : 7.8.2020  Chest Xray at [facility] \par -The groundglass opacity in the medial aspect of the right lower lobe seen on prior CT chest is not seen to good advantage on the current chest x-ray, and a follow-up CT chest would be necessary to follow-up this finding, and may be could be obtained as clinically indicated. \par -No focal consolidation or pleural effusion is identified. Cardiomediastinal silhouette appears unremarkable. \par \par 10/23/2018 [facility] \par -Few small pulmonary nodules are identified as above \par

## 2021-05-24 NOTE — HISTORY OF PRESENT ILLNESS
[FreeTextEntry1] : Jalen is a 61-year-old male with recent musculoskeletal pain  which  prompted a CT of the chest. This showed tiny pulmonary nodules.During our last visit I advised Jalen to obtain a followup CT scan in 6 months time for surveillance. Today he is here to discuss  his  latest results. \par \par Mr Schaeffer reports feeling well. He is currently under the care of a new PCP and is having routine evaluations for follow up care. He denies any shortness of breath, chest pain, cough, unintentional weight loss/gain, fever, chills or night sweats.

## 2021-05-24 NOTE — REVIEW OF SYSTEMS
[Negative] : Heme/Lymph [Feeling Poorly] : not feeling poorly [Feeling Tired] : not feeling tired [Shortness Of Breath] : no shortness of breath [Cough] : no cough [SOB on Exertion] : no shortness of breath during exertion

## 2021-05-24 NOTE — PHYSICAL EXAM
[Neck Appearance] : the appearance of the neck was normal [Neck Cervical Mass (___cm)] : no neck mass was observed [Jugular Venous Distention Increased] : there was no jugular-venous distention [Thyroid Diffuse Enlargement] : the thyroid was not enlarged [Thyroid Nodule] : there were no palpable thyroid nodules [] : no respiratory distress [Auscultation Breath Sounds / Voice Sounds] : lungs were clear to auscultation bilaterally [Heart Rate And Rhythm] : heart rate was normal and rhythm regular [Heart Sounds] : normal S1 and S2 [Heart Sounds Gallop] : no gallops [Murmurs] : no murmurs [Heart Sounds Pericardial Friction Rub] : no pericardial rub [Examination Of The Chest] : the chest was normal in appearance [Chest Visual Inspection Thoracic Asymmetry] : no chest asymmetry [Diminished Respiratory Excursion] : normal chest expansion [Right Carotid Bruit] : no bruit heard over the right carotid [Left Carotid Bruit] : no bruit heard over the left carotid [Right Femoral Bruit] : no bruit heard over the right femoral artery [Left Femoral Bruit] : no bruit heard over the left femoral artery [No Spinal Tenderness] : no spinal tenderness [No Focal Deficits] : no focal deficits [Oriented To Time, Place, And Person] : oriented to person, place, and time [Impaired Insight] : insight and judgment were intact [Affect] : the affect was normal

## 2021-05-24 NOTE — CONSULT LETTER
[Dear  ___] : Dear  [unfilled], [Courtesy Letter:] : I had the pleasure of seeing your patient, [unfilled], in my office today. [Please see my note below.] : Please see my note below. [Consult Closing:] : Thank you very much for allowing me to participate in the care of this patient.  If you have any questions, please do not hesitate to contact me. [Sincerely,] : Sincerely, [FreeTextEntry2] : Dr. Rigoberto Woodruff  [FreeTextEntry3] : Chapincito Shafer MD\par Department of Cardiovascular and Thoracic Surgery\par \par [employer] [employer] [facility]

## 2021-05-24 NOTE — ASSESSMENT
[FreeTextEntry1] : Jalen is a 61-year-old male with a history of small lung nodules in the past, who has not been in this office since 2018. He did have a fall in July of 2020 and a CT scan was performed. At that time. This demonstrated a patchy opacity in the right lung, which may have been traumatic. The nodules were not visualized. I do believe a followup scan is appropriate. At this point to reassess the opacity seen in July. This will be ordered to my office in the very near future.\par \par Thank you for allowing me to participate in the care of your patient.\par \par 30 minutes was spent during this encounter.\par \par Chapincito Shafer MD\par Department of Cardiovascular and Thoracic Surgery\par \par [employer]\[facility] [employer] [facility]\par

## 2021-08-24 ENCOUNTER — APPOINTMENT (OUTPATIENT)
Dept: CT IMAGING | Facility: CLINIC | Age: 61
End: 2021-08-24
Payer: COMMERCIAL

## 2021-08-24 ENCOUNTER — RESULT REVIEW (OUTPATIENT)
Age: 61
End: 2021-08-24

## 2021-08-24 ENCOUNTER — OUTPATIENT (OUTPATIENT)
Dept: OUTPATIENT SERVICES | Facility: HOSPITAL | Age: 61
LOS: 1 days | End: 2021-08-24

## 2021-08-24 DIAGNOSIS — Z95.9 PRESENCE OF CARDIAC AND VASCULAR IMPLANT AND GRAFT, UNSPECIFIED: Chronic | ICD-10-CM

## 2021-08-24 DIAGNOSIS — R91.1 SOLITARY PULMONARY NODULE: ICD-10-CM

## 2021-08-24 PROCEDURE — 71250 CT THORAX DX C-: CPT | Mod: 26

## 2022-06-23 ENCOUNTER — OUTPATIENT (OUTPATIENT)
Dept: OUTPATIENT SERVICES | Facility: HOSPITAL | Age: 62
LOS: 1 days | End: 2022-06-23
Payer: COMMERCIAL

## 2022-06-23 DIAGNOSIS — I10 ESSENTIAL (PRIMARY) HYPERTENSION: ICD-10-CM

## 2022-06-23 DIAGNOSIS — Z95.9 PRESENCE OF CARDIAC AND VASCULAR IMPLANT AND GRAFT, UNSPECIFIED: Chronic | ICD-10-CM

## 2022-06-23 DIAGNOSIS — I25.10 ATHEROSCLEROTIC HEART DISEASE OF NATIVE CORONARY ARTERY WITHOUT ANGINA PECTORIS: ICD-10-CM

## 2022-06-23 PROCEDURE — A9500: CPT

## 2022-06-23 PROCEDURE — 93018 CV STRESS TEST I&R ONLY: CPT

## 2022-06-23 PROCEDURE — 78452 HT MUSCLE IMAGE SPECT MULT: CPT

## 2022-06-23 PROCEDURE — 93017 CV STRESS TEST TRACING ONLY: CPT

## 2022-06-23 PROCEDURE — 78452 HT MUSCLE IMAGE SPECT MULT: CPT | Mod: 26

## 2022-06-23 PROCEDURE — 93016 CV STRESS TEST SUPVJ ONLY: CPT

## 2022-09-16 NOTE — CONSULT NOTE ADULT - I WAS PHYSICALLY PRESENT FOR THE KEY PORTIONS OF THE EVALUATION AND MANAGEMENT (E/M) SERVICE PROVIDED.  I AGREE WITH THE ABOVE HISTORY, PHYSICAL, AND PLAN WHICH I HAVE REVIEWED AND EDITED WHERE APPROPRIATE
Puncture wounds cleaned, bacitracin applied and wrapped with gauze dressing. Patient then given both verbal and written dc instructions, verbalized understanding. All questions answered. Pt ambulatory with steady gait at time of discharge in no distress. Statement Selected [facility]

## 2023-03-24 NOTE — PHYSICAL THERAPY INITIAL EVALUATION ADULT - ASR EQUIP NEEDS DISCH PT EVAL
Pt refused the covid/flu/rsv Dr Ashanti Canales made aware       Dinora Seth, RN  03/24/23 [id number] crutches

## 2025-06-14 NOTE — ED ADULT NURSE NOTE - CAS EDN DISCHARGE ASSESSMENT
- On pravastatin 80mg QHS  - C/w atorvastatin 20mg QHS as equivalent  - C/w ezetimibe 10mg daily Alert and oriented to person, place and time